# Patient Record
Sex: MALE | Race: WHITE | NOT HISPANIC OR LATINO | Employment: FULL TIME | URBAN - METROPOLITAN AREA
[De-identification: names, ages, dates, MRNs, and addresses within clinical notes are randomized per-mention and may not be internally consistent; named-entity substitution may affect disease eponyms.]

---

## 2018-07-30 ENCOUNTER — CLINICAL SUPPORT (OUTPATIENT)
Dept: INTERNAL MEDICINE CLINIC | Facility: CLINIC | Age: 73
End: 2018-07-30

## 2018-07-30 DIAGNOSIS — Z11.1 PPD SCREENING TEST: Primary | ICD-10-CM

## 2018-08-01 ENCOUNTER — CLINICAL SUPPORT (OUTPATIENT)
Dept: INTERNAL MEDICINE CLINIC | Facility: CLINIC | Age: 73
End: 2018-08-01

## 2018-08-01 DIAGNOSIS — Z11.1 ENCOUNTER FOR PPD SKIN TEST READING: Primary | ICD-10-CM

## 2018-08-01 LAB
INDURATION: 0 MM
TB SKIN TEST: NEGATIVE

## 2018-08-01 NOTE — PROGRESS NOTES
Pt in office for PPD reading, pt is 1 hour early for test per DR Laura Sees ok to read results   Test was 0 mm   Negative results

## 2021-12-21 ENCOUNTER — APPOINTMENT (EMERGENCY)
Dept: RADIOLOGY | Facility: HOSPITAL | Age: 76
DRG: 316 | End: 2021-12-21
Payer: COMMERCIAL

## 2021-12-21 ENCOUNTER — HOSPITAL ENCOUNTER (INPATIENT)
Facility: HOSPITAL | Age: 76
LOS: 1 days | Discharge: HOME | DRG: 316 | End: 2021-12-21
Attending: EMERGENCY MEDICINE | Admitting: INTERNAL MEDICINE
Payer: COMMERCIAL

## 2021-12-21 VITALS
TEMPERATURE: 97.4 F | DIASTOLIC BLOOD PRESSURE: 70 MMHG | SYSTOLIC BLOOD PRESSURE: 113 MMHG | OXYGEN SATURATION: 98 % | HEART RATE: 73 BPM | RESPIRATION RATE: 16 BRPM

## 2021-12-21 DIAGNOSIS — R55 SYNCOPE: Primary | ICD-10-CM

## 2021-12-21 DIAGNOSIS — R00.1 BRADYCARDIA: ICD-10-CM

## 2021-12-21 DIAGNOSIS — R79.89 ELEVATED SERUM CREATININE: ICD-10-CM

## 2021-12-21 PROBLEM — I10 HTN (HYPERTENSION): Status: ACTIVE | Noted: 2021-12-21

## 2021-12-21 PROBLEM — E78.5 HLD (HYPERLIPIDEMIA): Status: ACTIVE | Noted: 2021-12-21

## 2021-12-21 PROBLEM — N28.9 ABNORMAL RENAL FUNCTION: Status: ACTIVE | Noted: 2021-12-21

## 2021-12-21 PROBLEM — K20.90 ESOPHAGITIS: Status: ACTIVE | Noted: 2021-12-21

## 2021-12-21 PROBLEM — D64.9 ANEMIA: Status: ACTIVE | Noted: 2021-12-21

## 2021-12-21 LAB
ALBUMIN SERPL BCP-MCNC: 3.5 G/DL (ref 3.5–5)
ALP SERPL-CCNC: 109 U/L (ref 46–116)
ALT SERPL W P-5'-P-CCNC: 18 U/L (ref 12–78)
ANION GAP SERPL CALCULATED.3IONS-SCNC: 4 MMOL/L (ref 4–13)
AST SERPL W P-5'-P-CCNC: 15 U/L (ref 5–45)
ATRIAL RATE: 56 BPM
BASOPHILS # BLD AUTO: 0.02 THOUSANDS/ΜL (ref 0–0.1)
BASOPHILS NFR BLD AUTO: 0 % (ref 0–1)
BILIRUB SERPL-MCNC: 0.35 MG/DL (ref 0.2–1)
BUN SERPL-MCNC: 30 MG/DL (ref 5–25)
CALCIUM SERPL-MCNC: 8.6 MG/DL (ref 8.3–10.1)
CARDIAC TROPONIN I PNL SERPL HS: 2 NG/L
CHLORIDE SERPL-SCNC: 109 MMOL/L (ref 100–108)
CO2 SERPL-SCNC: 26 MMOL/L (ref 21–32)
CREAT SERPL-MCNC: 1.68 MG/DL (ref 0.6–1.3)
EOSINOPHIL # BLD AUTO: 0.17 THOUSAND/ΜL (ref 0–0.61)
EOSINOPHIL NFR BLD AUTO: 2 % (ref 0–6)
ERYTHROCYTE [DISTWIDTH] IN BLOOD BY AUTOMATED COUNT: 13.8 % (ref 11.6–15.1)
GFR SERPL CREATININE-BSD FRML MDRD: 39 ML/MIN/1.73SQ M
GLUCOSE SERPL-MCNC: 111 MG/DL (ref 65–140)
HCT VFR BLD AUTO: 33.5 % (ref 36.5–49.3)
HGB BLD-MCNC: 10.8 G/DL (ref 12–17)
IMM GRANULOCYTES # BLD AUTO: 0.04 THOUSAND/UL (ref 0–0.2)
IMM GRANULOCYTES NFR BLD AUTO: 1 % (ref 0–2)
LYMPHOCYTES # BLD AUTO: 1.03 THOUSANDS/ΜL (ref 0.6–4.47)
LYMPHOCYTES NFR BLD AUTO: 12 % (ref 14–44)
MCH RBC QN AUTO: 29.3 PG (ref 26.8–34.3)
MCHC RBC AUTO-ENTMCNC: 32.2 G/DL (ref 31.4–37.4)
MCV RBC AUTO: 91 FL (ref 82–98)
MONOCYTES # BLD AUTO: 0.69 THOUSAND/ΜL (ref 0.17–1.22)
MONOCYTES NFR BLD AUTO: 8 % (ref 4–12)
NEUTROPHILS # BLD AUTO: 6.59 THOUSANDS/ΜL (ref 1.85–7.62)
NEUTS SEG NFR BLD AUTO: 77 % (ref 43–75)
NRBC BLD AUTO-RTO: 0 /100 WBCS
P AXIS: 69 DEGREES
PLATELET # BLD AUTO: 168 THOUSANDS/UL (ref 149–390)
PMV BLD AUTO: 10.6 FL (ref 8.9–12.7)
POTASSIUM SERPL-SCNC: 4 MMOL/L (ref 3.5–5.3)
PR INTERVAL: 162 MS
PROT SERPL-MCNC: 6.7 G/DL (ref 6.4–8.2)
QRS AXIS: 38 DEGREES
QRSD INTERVAL: 88 MS
QT INTERVAL: 422 MS
QTC INTERVAL: 407 MS
RBC # BLD AUTO: 3.68 MILLION/UL (ref 3.88–5.62)
SODIUM SERPL-SCNC: 139 MMOL/L (ref 136–145)
T WAVE AXIS: 52 DEGREES
VENTRICULAR RATE: 56 BPM
WBC # BLD AUTO: 8.54 THOUSAND/UL (ref 4.31–10.16)

## 2021-12-21 PROCEDURE — 80053 COMPREHEN METABOLIC PANEL: CPT | Performed by: EMERGENCY MEDICINE

## 2021-12-21 PROCEDURE — NC001 PR NO CHARGE: Performed by: INTERNAL MEDICINE

## 2021-12-21 PROCEDURE — 85025 COMPLETE CBC W/AUTO DIFF WBC: CPT | Performed by: EMERGENCY MEDICINE

## 2021-12-21 PROCEDURE — 93010 ELECTROCARDIOGRAM REPORT: CPT | Performed by: INTERNAL MEDICINE

## 2021-12-21 PROCEDURE — 99223 1ST HOSP IP/OBS HIGH 75: CPT | Performed by: INTERNAL MEDICINE

## 2021-12-21 PROCEDURE — 99285 EMERGENCY DEPT VISIT HI MDM: CPT

## 2021-12-21 PROCEDURE — 99285 EMERGENCY DEPT VISIT HI MDM: CPT | Performed by: EMERGENCY MEDICINE

## 2021-12-21 PROCEDURE — 84484 ASSAY OF TROPONIN QUANT: CPT | Performed by: EMERGENCY MEDICINE

## 2021-12-21 PROCEDURE — 71045 X-RAY EXAM CHEST 1 VIEW: CPT

## 2021-12-21 PROCEDURE — 99222 1ST HOSP IP/OBS MODERATE 55: CPT | Performed by: INTERNAL MEDICINE

## 2021-12-21 PROCEDURE — 93005 ELECTROCARDIOGRAM TRACING: CPT

## 2021-12-21 RX ORDER — SIMVASTATIN 40 MG
40 TABLET ORAL
COMMUNITY

## 2021-12-21 RX ORDER — ACETAMINOPHEN 325 MG/1
650 TABLET ORAL EVERY 6 HOURS PRN
Status: CANCELLED | OUTPATIENT
Start: 2021-12-21

## 2021-12-21 RX ORDER — PANTOPRAZOLE SODIUM 40 MG/1
40 TABLET, DELAYED RELEASE ORAL DAILY
COMMUNITY
Start: 2021-11-22

## 2021-12-21 RX ORDER — CALCIUM CARBONATE 200(500)MG
1000 TABLET,CHEWABLE ORAL DAILY PRN
Status: CANCELLED | OUTPATIENT
Start: 2021-12-21

## 2021-12-21 RX ORDER — PANTOPRAZOLE SODIUM 40 MG/1
40 TABLET, DELAYED RELEASE ORAL DAILY
Status: CANCELLED | OUTPATIENT
Start: 2021-12-21

## 2021-12-21 RX ORDER — PRAVASTATIN SODIUM 20 MG
20 TABLET ORAL
Status: CANCELLED | OUTPATIENT
Start: 2021-12-21

## 2021-12-21 RX ORDER — SODIUM CHLORIDE, SODIUM GLUCONATE, SODIUM ACETATE, POTASSIUM CHLORIDE, MAGNESIUM CHLORIDE, SODIUM PHOSPHATE, DIBASIC, AND POTASSIUM PHOSPHATE .53; .5; .37; .037; .03; .012; .00082 G/100ML; G/100ML; G/100ML; G/100ML; G/100ML; G/100ML; G/100ML
75 INJECTION, SOLUTION INTRAVENOUS CONTINUOUS
Status: CANCELLED | OUTPATIENT
Start: 2021-12-21

## 2021-12-21 RX ORDER — LISINOPRIL 5 MG/1
5 TABLET ORAL DAILY
COMMUNITY
Start: 2021-11-23 | End: 2021-12-21

## 2021-12-21 RX ORDER — HEPARIN SODIUM 5000 [USP'U]/ML
5000 INJECTION, SOLUTION INTRAVENOUS; SUBCUTANEOUS EVERY 8 HOURS SCHEDULED
Status: CANCELLED | OUTPATIENT
Start: 2021-12-21

## 2021-12-21 RX ORDER — ONDANSETRON 2 MG/ML
4 INJECTION INTRAMUSCULAR; INTRAVENOUS EVERY 6 HOURS PRN
Status: CANCELLED | OUTPATIENT
Start: 2021-12-21

## 2022-02-18 ENCOUNTER — APPOINTMENT (OUTPATIENT)
Dept: LAB | Facility: IMAGING CENTER | Age: 77
End: 2022-02-18
Payer: COMMERCIAL

## 2022-02-18 DIAGNOSIS — R42 DIZZINESS AND GIDDINESS: ICD-10-CM

## 2022-02-18 DIAGNOSIS — D64.9 ANEMIA, UNSPECIFIED TYPE: ICD-10-CM

## 2022-02-18 LAB
ANION GAP SERPL CALCULATED.3IONS-SCNC: 3 MMOL/L (ref 4–13)
BASOPHILS # BLD AUTO: 0.07 THOUSANDS/ΜL (ref 0–0.1)
BASOPHILS NFR BLD AUTO: 1 % (ref 0–1)
BUN SERPL-MCNC: 29 MG/DL (ref 5–25)
CALCIUM SERPL-MCNC: 10.5 MG/DL (ref 8.3–10.1)
CHLORIDE SERPL-SCNC: 106 MMOL/L (ref 100–108)
CO2 SERPL-SCNC: 30 MMOL/L (ref 21–32)
CREAT SERPL-MCNC: 1.47 MG/DL (ref 0.6–1.3)
EOSINOPHIL # BLD AUTO: 0.73 THOUSAND/ΜL (ref 0–0.61)
EOSINOPHIL NFR BLD AUTO: 8 % (ref 0–6)
ERYTHROCYTE [DISTWIDTH] IN BLOOD BY AUTOMATED COUNT: 13.2 % (ref 11.6–15.1)
FERRITIN SERPL-MCNC: 16 NG/ML (ref 8–388)
GFR SERPL CREATININE-BSD FRML MDRD: 46 ML/MIN/1.73SQ M
GLUCOSE SERPL-MCNC: 98 MG/DL (ref 65–140)
HCT VFR BLD AUTO: 43.6 % (ref 36.5–49.3)
HGB BLD-MCNC: 13.6 G/DL (ref 12–17)
IMM GRANULOCYTES # BLD AUTO: 0.03 THOUSAND/UL (ref 0–0.2)
IMM GRANULOCYTES NFR BLD AUTO: 0 % (ref 0–2)
LYMPHOCYTES # BLD AUTO: 2.15 THOUSANDS/ΜL (ref 0.6–4.47)
LYMPHOCYTES NFR BLD AUTO: 25 % (ref 14–44)
MCH RBC QN AUTO: 27 PG (ref 26.8–34.3)
MCHC RBC AUTO-ENTMCNC: 31.2 G/DL (ref 31.4–37.4)
MCV RBC AUTO: 87 FL (ref 82–98)
MONOCYTES # BLD AUTO: 1.02 THOUSAND/ΜL (ref 0.17–1.22)
MONOCYTES NFR BLD AUTO: 12 % (ref 4–12)
NEUTROPHILS # BLD AUTO: 4.75 THOUSANDS/ΜL (ref 1.85–7.62)
NEUTS SEG NFR BLD AUTO: 54 % (ref 43–75)
NRBC BLD AUTO-RTO: 0 /100 WBCS
PLATELET # BLD AUTO: 235 THOUSANDS/UL (ref 149–390)
PMV BLD AUTO: 11.8 FL (ref 8.9–12.7)
POTASSIUM SERPL-SCNC: 4.6 MMOL/L (ref 3.5–5.3)
RBC # BLD AUTO: 5.04 MILLION/UL (ref 3.88–5.62)
SODIUM SERPL-SCNC: 139 MMOL/L (ref 136–145)
WBC # BLD AUTO: 8.75 THOUSAND/UL (ref 4.31–10.16)

## 2022-02-18 PROCEDURE — 80048 BASIC METABOLIC PNL TOTAL CA: CPT

## 2022-02-18 PROCEDURE — 85025 COMPLETE CBC W/AUTO DIFF WBC: CPT

## 2022-02-18 PROCEDURE — 82728 ASSAY OF FERRITIN: CPT

## 2022-02-18 PROCEDURE — 36415 COLL VENOUS BLD VENIPUNCTURE: CPT

## 2023-12-19 ENCOUNTER — TELEPHONE (OUTPATIENT)
Dept: GASTROENTEROLOGY | Facility: CLINIC | Age: 78
End: 2023-12-19

## 2023-12-19 ENCOUNTER — TRANSCRIBE ORDERS (OUTPATIENT)
Dept: GASTROENTEROLOGY | Facility: CLINIC | Age: 78
End: 2023-12-19

## 2023-12-19 NOTE — TELEPHONE ENCOUNTER
Z12.10 (ICD-10-CM) - Encounter for screening for malignant neoplasm of intestinal tract, unspecified     [id number]    Effective: 12/12/23-6/9/24    I called the patient and LV for them to schedule and OV for a colonoscopy consultation.

## 2023-12-27 ENCOUNTER — EVALUATION (OUTPATIENT)
Age: 78
End: 2023-12-27
Payer: COMMERCIAL

## 2023-12-27 DIAGNOSIS — M25.562 LEFT KNEE PAIN, UNSPECIFIED CHRONICITY: ICD-10-CM

## 2023-12-27 DIAGNOSIS — M25.562 CHRONIC PAIN OF LEFT KNEE: Primary | ICD-10-CM

## 2023-12-27 DIAGNOSIS — G89.29 CHRONIC PAIN OF LEFT KNEE: Primary | ICD-10-CM

## 2023-12-27 PROCEDURE — 97161 PT EVAL LOW COMPLEX 20 MIN: CPT | Performed by: PHYSICAL THERAPIST

## 2023-12-27 PROCEDURE — 97112 NEUROMUSCULAR REEDUCATION: CPT | Performed by: PHYSICAL THERAPIST

## 2023-12-27 PROCEDURE — 97110 THERAPEUTIC EXERCISES: CPT | Performed by: PHYSICAL THERAPIST

## 2023-12-27 NOTE — LETTER
2023    Madhavi Smith MD  [address] [facility] [address]    Patient: Brady Alicea   YOB: 1945   Date of Visit: 2023     Encounter Diagnosis     ICD-10-CM    1. Chronic pain of left knee  M25.562     G89.29       2. Left knee pain, unspecified chronicity  M25.562           Dear Dr. Smith:    Thank you for your recent referral of Brady Alicea. Please review the attached evaluation summary from Brady Allen's recent visit.     Please verify that you agree with the plan of care by signing the attached order.     If you have any questions or concerns, please do not hesitate to call.     I sincerely appreciate the opportunity to share in the care of one of your patients and hope to have another opportunity to work with you in the near future.       Sincerely,    Kathi Seals, PT      Referring Provider:      I certify that I have read the below Plan of Care and certify the need for these services furnished under this plan of treatment while under my care.                    Madhavi Smith MD  [address] [facility] [address]  Via Fax: [phone]          PT Evaluation     Today's date: 2023  Patient name: Brady Alicea  : 1945  MRN: [id number]  Referring provider: Madhavi Smith MD  Dx:   Encounter Diagnosis     ICD-10-CM    1. Chronic pain of left knee  M25.562     G89.29       2. Left knee pain, unspecified chronicity  M25.562           Start Time: 1530  Stop Time: 1630  Total time in clinic (min): 60 minutes    Assessment  Assessment details: Patient is a 77 yo male presenting to physical therapy with symptoms consistent with L knee that started about 3 years ago. Patient presents with decreased L knee AROM, decreased hip and knee strength and gait deviations such as lateral knee and decreased heel strike on the L. Patient has increased difficulty with walking, descending  steps, bed mobility and sleeping. PT will address the noted impairments by performing hip and knee strengthening, stretching, balance, functional activities and manual techniques to allow the patient to return to his PLOF. PT recommended 2x/week for 6-8 weeks c a good prognosis 2* PLOF.    Impairments: abnormal gait, abnormal or restricted ROM, activity intolerance, impaired balance, impaired physical strength, lacks appropriate home exercise program, pain with function, poor posture  and poor body mechanics  Understanding of Dx/Px/POC: good   Prognosis: good    Goals  STG: In four weeks the patient will:    1. Be (I) with his HEP.  2. Increase hip and knee strength to 4+/5 MMT score to assist c ADLs.  3. Increase L knee extension AROM to 0-3 degrees to assist c steps and gait.      LTG: In eight weeks, the patient will:    1. Increase FOTO score to 70 to demonstrate improvements in symptoms and function.  2. Demonstrate 50% improvements in L knee AROM without pain.  3. Perform 10 minutes on the bike at home daily without pain.   4. Increase hip and knee strength to 5/5 MMT score to assist c prolonged activities.   5. Amb 1 mile with minimal pain noted.   6. Roll over in bed with no pain noted.   7. Descend a full flight of stairs with < 2/10 pain.     Plan  Patient would benefit from: skilled physical therapy and PT eval  Planned modality interventions: cryotherapy and thermotherapy: hydrocollator packs  Planned therapy interventions: IASTM, joint mobilization, kinesiology taping, manual therapy, massage, Carson taping, neuromuscular re-education, patient education, postural training, abdominal trunk stabilization, balance, body mechanics training, breathing training, strengthening, stretching, therapeutic activities, therapeutic exercise, transfer training, home exercise program, functional ROM exercises, flexibility and gait training  Frequency: 2x week  Duration in visits: 16  Duration in weeks: 8  Plan of  "Care beginning date: 2023  Plan of Care expiration date: 2024  Treatment plan discussed with: patient        Subjective Evaluation    History of Present Illness  Mechanism of injury: Patient noted L knee pain that started about 3 years ago. Patient noted that he was playing football and injured it. Patient noted that sleeping is painful and stiff. Patient noted pain with twisting or pivoting of the knee. Patient likes to walk 1-2 miles, however now he has pain with walking currently. Patient noted that he was walking with the SPC due to R foot surgery and he feels that he overcompensated. Patient does not walk with the cane currently. Descending the steps is challenging and performs a step to pattern. Patient noted that he recently fx a pin in his foot and had surgery to fix that. Patient noted that his balance is challenged at times. Patient noted that his pain is intermittent and the knee pops.   Patient Goals  Patient goals for therapy: increased strength, independence with ADLs/IADLs, return to sport/leisure activities, increased motion, improved balance and decreased pain  Patient goal: \"to be more confident with walking.\" \"to negotiate steps with less pain.\"  Pain  Current pain ratin  At best pain ratin  At worst pain ratin  Location: L patella  Quality: sharp (lasts a few seconds; stiffness; night is painful)  Relieving factors: rest (biofreeze)  Aggravating factors: standing, walking, sitting and lifting (sleeping)  Progression: worsening    Social Support  Steps to enter house: yes  4  Stairs in house: yes   8  Lives in: multiple-level home  Lives with: adult children (Son and son's girlfriend)    Employment status: not working  Hand dominance: left      Diagnostic Tests  X-ray: abnormal        Objective     Active Range of Motion   Left Knee   Flexion: 125 degrees   Extension: 8 degrees   Extensor la degrees     Right Knee   Normal active range of motion    Passive Range of " "Motion   Left Knee   Flexion: 125 degrees   Extension: 3 degrees     Right Knee   Normal passive range of motion    Mobility   Patellar Mobility:   Left Knee   WFL: medial, lateral, superior and inferior.   Tibiofemoral Mobility:   Left knee Hypomobile in the anterior and posterior tibiofemoral tendon(s).     Strength/Myotome Testing     Left Hip   Planes of Motion   Flexion: 4  Extension: 4-  Abduction: 4-  Adduction: 4    Right Hip   Planes of Motion   Flexion: 4  Extension: 4-  Abduction: 4-  Adduction: 4    Left Knee   Flexion: 4-  Extension: 4    Right Knee   Flexion: 4-  Extension: 4    Left Ankle/Foot   Dorsiflexion: 4    Right Ankle/Foot   Dorsiflexion: 4    Tests     Left Knee   Negative anterior drawer and posterior drawer.       Flowsheet Rows      Flowsheet Row Most Recent Value   PT/OT G-Codes    Current Score 60   Projected Score 70   Assessment Type Evaluation               Precautions: hx of R foot surgery, short term memory loss, hx of HTN      Manuals 12/27            L patellar joint mobs (sup/inf/med/inf) nv            L knee PROM nv                                      Neuro Re-Ed             HEP edu MW            Quad set nv            clams nv            3 way hip nv            FTEO/FETC nv            Tandem stance nv                         Ther Ex             Nustep nv            Hip add X10  5\" hold            Hip abd X10  5\" hold            LAQ nv            Heel raises nv            Glute set X10  5\" hold            Seated marching nv                         Ther Activity                                       Gait Training                                       Modalities                                                            "

## 2023-12-29 ENCOUNTER — OFFICE VISIT (OUTPATIENT)
Age: 78
End: 2023-12-29
Payer: COMMERCIAL

## 2023-12-29 DIAGNOSIS — G89.29 CHRONIC PAIN OF LEFT KNEE: Primary | ICD-10-CM

## 2023-12-29 DIAGNOSIS — M25.562 LEFT KNEE PAIN, UNSPECIFIED CHRONICITY: ICD-10-CM

## 2023-12-29 DIAGNOSIS — M25.562 CHRONIC PAIN OF LEFT KNEE: Primary | ICD-10-CM

## 2023-12-29 PROCEDURE — 97112 NEUROMUSCULAR REEDUCATION: CPT | Performed by: PHYSICAL THERAPIST

## 2023-12-29 PROCEDURE — 97110 THERAPEUTIC EXERCISES: CPT | Performed by: PHYSICAL THERAPIST

## 2023-12-29 NOTE — PROGRESS NOTES
"Daily Note     Today's date: 2023  Patient name: Brady Alicea  : 1945  MRN: [id number]  Referring provider: Madhavi Smith MD  Dx:   Encounter Diagnosis     ICD-10-CM    1. Chronic pain of left knee  M25.562     G89.29       2. Left knee pain, unspecified chronicity  M25.562           Start Time: 1115  Stop Time: 1155  Total time in clinic (min): 40 minutes    Subjective: Patient noted that he tried to perform his HEP on the floor and had a hard time getting off the floor and felt dizzy. Patient noted that his pain is intermittent in the knee.       Objective: See treatment diary below      Assessment: Patient performed Nustep aerobic exercise to increase blood flow to the area being treated, prepare the muscles for strength training and stretching, improve overall tolerance to activity, and aerobic endurance. PT educated the patient on performing his HEP on his bed or in a chair. PT printed out a HEP that can be performed in a chair to decreased vertigo symptoms. Patient performed exercises with min VCS for form and increased pain noted by the patient. Patient would benefit from continued PT to allow the patient to return to his PLOF.         Plan: Continue per plan of care.      Precautions: hx of R foot surgery, short term memory loss, hx of HTN, supine to sit causes dizzy symptoms.       Manuals            L patellar joint mobs (sup/inf/med/inf) nv            L knee PROM nv                                      Neuro Re-Ed             HEP edu MW MW           Quad set nv            clams nv            3 way hip nv            FTEO/FETC nv            Tandem stance nv                         Ther Ex             Nustep nv 10'  Lvl 2           Hip add X10  5\" hold X20 5\" hold  seated           Hip abd X10  5\" hold GTB  X20  5\" hold           LAQ nv X20 ea           Heel raises nv X20 ea           Seated hamstring curls  GTB  X20 ea           Glute set X10  5\" hold          "   Seated marching nv GTB  x20                        Ther Activity                                       Gait Training                                       Modalities

## 2024-01-02 ENCOUNTER — TRANSCRIBE ORDERS (OUTPATIENT)
Dept: GASTROENTEROLOGY | Facility: CLINIC | Age: 79
End: 2024-01-02

## 2024-01-04 ENCOUNTER — OFFICE VISIT (OUTPATIENT)
Age: 79
End: 2024-01-04
Payer: COMMERCIAL

## 2024-01-04 DIAGNOSIS — M25.562 LEFT KNEE PAIN, UNSPECIFIED CHRONICITY: ICD-10-CM

## 2024-01-04 DIAGNOSIS — G89.29 CHRONIC PAIN OF LEFT KNEE: Primary | ICD-10-CM

## 2024-01-04 DIAGNOSIS — M25.562 CHRONIC PAIN OF LEFT KNEE: Primary | ICD-10-CM

## 2024-01-04 PROCEDURE — 97112 NEUROMUSCULAR REEDUCATION: CPT | Performed by: PHYSICAL THERAPIST

## 2024-01-04 PROCEDURE — 97110 THERAPEUTIC EXERCISES: CPT | Performed by: PHYSICAL THERAPIST

## 2024-01-04 NOTE — PROGRESS NOTES
"Daily Note     Today's date: 2024  Patient name: Brady Alicea  : 1945  MRN: [id number]  Referring provider: Madhavi Smith MD  Dx:   Encounter Diagnosis     ICD-10-CM    1. Chronic pain of left knee  M25.562     G89.29       2. Left knee pain, unspecified chronicity  M25.562           Start Time: 0815  Stop Time: 0900  Total time in clinic (min): 45 minutes    Subjective: Patient noted that he is having trouble performing his HEP due to the chairs being too high. Patient noted that he tried to performed hip add on his bed, however he slid on the side and felt a crack in his neck.       Objective: See treatment diary below      Assessment: Patient performed Nustep aerobic exercise to increase blood flow to the area being treated, prepare the muscles for strength training and stretching, improve overall tolerance to activity, and aerobic endurance. PT educated the patient moderately about how to perform his HEP in a safe way. Patient noted some dizzy symptoms after standing hip abd and ext, however after sitting this subsided. Patient would benefit from continued PT to allow the patient to return to his PLOF.         Plan: Continue per plan of care.      Precautions: hx of R foot surgery, short term memory loss, hx of HTN, supine to sit causes dizzy symptoms.       Manuals /4          L patellar joint mobs (sup/inf/med/inf) nv            L knee PROM nv                                      Neuro Re-Ed             HEP edu MW MW MW          Quad set nv            clams nv            3 way hip nv  Abd + ext  X20 ea          FTEO/FETC nv            Tandem stance nv                         Ther Ex             Nustep nv 10'  Lvl 2 10'  Lvl 4          Hip add X10  5\" hold X20 5\" hold  seated X20 5\" hold  seated          Hip abd X10  5\" hold GTB  X20  5\" hold GTB  X20  5\" hold          LAQ nv X20 ea X20 ea          Heel raises nv X20 ea           Seated hamstring curls  GTB  X20 ea      " "     Glute set X10  5\" hold            Seated marching nv GTB  x20 X20 ea                       Ther Activity                                       Gait Training                                       Modalities                                              "

## 2024-01-05 ENCOUNTER — OFFICE VISIT (OUTPATIENT)
Age: 79
End: 2024-01-05
Payer: COMMERCIAL

## 2024-01-05 DIAGNOSIS — G89.29 CHRONIC PAIN OF LEFT KNEE: Primary | ICD-10-CM

## 2024-01-05 DIAGNOSIS — M25.562 CHRONIC PAIN OF LEFT KNEE: Primary | ICD-10-CM

## 2024-01-05 DIAGNOSIS — M25.562 LEFT KNEE PAIN, UNSPECIFIED CHRONICITY: ICD-10-CM

## 2024-01-05 PROCEDURE — 97112 NEUROMUSCULAR REEDUCATION: CPT | Performed by: PHYSICAL THERAPIST

## 2024-01-05 PROCEDURE — 97110 THERAPEUTIC EXERCISES: CPT | Performed by: PHYSICAL THERAPIST

## 2024-01-05 NOTE — PROGRESS NOTES
"Daily Note     Today's date: 2024  Patient name: Brady Alicea  : 1945  MRN: [id number]  Referring provider: Madhavi Smith MD  Dx:   Encounter Diagnosis     ICD-10-CM    1. Chronic pain of left knee  M25.562     G89.29       2. Left knee pain, unspecified chronicity  M25.562           Start Time: 1100  Stop Time: 1145  Total time in clinic (min): 45 minutes    Subjective: Patient noted that he is feeling okay today. Patient is nervous about his upcoming MRI. Patient noted that he has some L medial knee pain. Patient noted that he placed a stool underneath his feet to perform his HEP. Patient noted it was easier with a stool.       Objective: See treatment diary below      Assessment: Patient performed Nustep aerobic exercise to increase blood flow to the area being treated, prepare the muscles for strength training and stretching, improve overall tolerance to activity, and aerobic endurance. Patient performed exercises on a table with the back inclined. Patient performed quad sets with increased difficulty on the L when compared to the R. PT educated the patient on his HEP. Patient would benefit from continued PT to allow the patient to return to his PLOF.         Plan: Continue per plan of care.      Precautions: hx of R foot surgery, short term memory loss, hx of HTN, supine to sit causes dizzy symptoms.       Manuals          L patellar joint mobs (sup/inf/med/inf) nv            L knee PROM nv                                      Neuro Re-Ed             HEP edu MW MW MW MW         Quad set nv   X20  5\" hold ea         clams nv            3 way hip nv  Abd + ext  X20 ea          FTEO/FETC nv            Tandem stance nv                         Ther Ex             Nustep nv 10'  Lvl 2 10'  Lvl 4 10'  Lvl 4         Hip add X10  5\" hold X20 5\" hold  seated X20 5\" hold  seated X20 5\" hold  seated         Hip abd X10  5\" hold GTB  X20  5\" hold GTB  X20  5\" hold GTB  X20  5\" " "hold         LAQ nv X20 ea X20 ea X20 ea         Heel raises nv X20 ea           Seated hamstring curls  GTB  X20 ea           Glute set X10  5\" hold            Seated marching nv GTB  x20 X20 ea X20 ea                      Ther Activity                                       Gait Training                                       Modalities                                                "

## 2024-01-08 ENCOUNTER — OFFICE VISIT (OUTPATIENT)
Age: 79
End: 2024-01-08
Payer: COMMERCIAL

## 2024-01-08 DIAGNOSIS — M25.562 CHRONIC PAIN OF LEFT KNEE: Primary | ICD-10-CM

## 2024-01-08 DIAGNOSIS — G89.29 CHRONIC PAIN OF LEFT KNEE: Primary | ICD-10-CM

## 2024-01-08 DIAGNOSIS — M25.562 LEFT KNEE PAIN, UNSPECIFIED CHRONICITY: ICD-10-CM

## 2024-01-08 PROCEDURE — 97112 NEUROMUSCULAR REEDUCATION: CPT | Performed by: PHYSICAL THERAPIST

## 2024-01-08 PROCEDURE — 97110 THERAPEUTIC EXERCISES: CPT | Performed by: PHYSICAL THERAPIST

## 2024-01-08 NOTE — PROGRESS NOTES
"Daily Note     Today's date: 2024  Patient name: Brady Alicea  : 1945  MRN: [id number]  Referring provider: Madhavi Smith MD  Dx:   Encounter Diagnosis     ICD-10-CM    1. Chronic pain of left knee  M25.562     G89.29       2. Left knee pain, unspecified chronicity  M25.562           Start Time: 0830  Stop Time: 0900  Total time in clinic (min): 30 minutes    Subjective: Patient noted he has pain when he plants his left leg and twists.       Objective: See treatment diary below      Assessment: Patient performed Nustep aerobic exercise to increase blood flow to the area being treated, prepare the muscles for strength training and stretching, improve overall tolerance to activity, and aerobic endurance. Exercises were limited due to the patient being late to the appointment. PT introduced ankle strengthening exercise to assist c pivoting and pain. Patient required moderate cues for  form throughout the session. Patient noted some pain with step ups. PT educated the patient on his HEP. Patient would benefit from continued PT to allow the patient to return to his PLOF.         Plan: Continue per plan of care.      Precautions: hx of R foot surgery, short term memory loss, hx of HTN, supine to sit causes dizzy symptoms.       Manuals         L patellar joint mobs (sup/inf/med/inf) nv            L knee PROM nv                                      Neuro Re-Ed             HEP edu MW MW MW MW MW        Quad set nv   X20  5\" hold ea X20  5\" hold        clams nv            3 way hip nv  Abd + ext  X20 ea          FTEO/FETC nv            Tandem stance nv            Step ups     4\"  X15 ea        Ther Ex             Nustep nv 10'  Lvl 2 10'  Lvl 4 10'  Lvl 4 10'  Lvl 4        Hip add X10  5\" hold X20 5\" hold  seated X20 5\" hold  seated X20 5\" hold  seated X20  5\" hold         Hip abd X10  5\" hold GTB  X20  5\" hold GTB  X20  5\" hold GTB  X20  5\" hold         LAQ nv X20 ea X20 " "ea X20 ea         Heel raises nv X20 ea           Seated hamstring curls  GTB  X20 ea           Glute set X10  5\" hold            Seated marching nv GTB  x20 X20 ea X20 ea         Ankle eversion and inversion with PT holding band     OTB  X10 ea(B)        Ther Activity                                       Gait Training                                       Modalities                                                  "

## 2024-01-12 ENCOUNTER — OFFICE VISIT (OUTPATIENT)
Age: 79
End: 2024-01-12
Payer: COMMERCIAL

## 2024-01-12 DIAGNOSIS — G89.29 CHRONIC PAIN OF LEFT KNEE: Primary | ICD-10-CM

## 2024-01-12 DIAGNOSIS — M25.562 CHRONIC PAIN OF LEFT KNEE: Primary | ICD-10-CM

## 2024-01-12 DIAGNOSIS — M25.562 LEFT KNEE PAIN, UNSPECIFIED CHRONICITY: ICD-10-CM

## 2024-01-12 PROCEDURE — 97110 THERAPEUTIC EXERCISES: CPT | Performed by: PHYSICAL THERAPIST

## 2024-01-12 PROCEDURE — 97112 NEUROMUSCULAR REEDUCATION: CPT | Performed by: PHYSICAL THERAPIST

## 2024-01-12 NOTE — PROGRESS NOTES
"Daily Note     Today's date: 2024  Patient name: Brady Alicea  : 1945  MRN: [id number]  Referring provider: Madhavi Smith MD  Dx:   Encounter Diagnosis     ICD-10-CM    1. Chronic pain of left knee  M25.562     G89.29       2. Left knee pain, unspecified chronicity  M25.562           Start Time: 09  Stop Time: 1030  Total time in clinic (min): 45 minutes    Subjective: Patient noted that his knee gives way at times. Patient noted that he went for a walk yesterday with no difficulty.       Objective: See treatment diary below      Assessment: Patient performed Nustep aerobic exercise to increase blood flow to the area being treated, prepare the muscles for strength training and stretching, improve overall tolerance to activity, and aerobic endurance. Patient performed standing exercises with min Vcs without increased pain. Patient noted some pain with LAQ. Patient improved with ankle ROM during strengthening exercises. Patient continues to improve with strength 2* walking yesterday without any pain. Patient would benefit from continued PT to allow the patient to return to his PLOF.         Plan: Continue per plan of care.      Precautions: hx of R foot surgery, short term memory loss, hx of HTN, supine to sit causes dizzy symptoms.       Manuals        L patellar joint mobs (sup/inf/med/inf) nv            L knee PROM nv                                      Neuro Re-Ed             HEP edu MW MW MW MW MW MW       Quad set nv   X20  5\" hold ea X20  5\" hold X20  5\" hold       clams nv            3 way hip nv  Abd + ext  X20 ea   Abd + ext  X20 ea       FTEO/FETC nv            Tandem stance nv            Step ups     4\"  X15 ea Fwd & lateral  4\"  X15 ea       Ther Ex             Nustep nv 10'  Lvl 2 10'  Lvl 4 10'  Lvl 4 10'  Lvl 4 10' lvl 4       Hip add X10  5\" hold X20 5\" hold  seated X20 5\" hold  seated X20 5\" hold  seated X20  5\" hold  X20  5\" hold       Hip " "abd X10  5\" hold GTB  X20  5\" hold GTB  X20  5\" hold GTB  X20  5\" hold  GTB  X20  5\"hold       LAQ nv X20 ea X20 ea X20 ea  X20 ea       Heel raises nv X20 ea    hold       Seated hamstring curls  GTB  X20 ea           Glute set X10  5\" hold            Standing marching      X20 ea       Seated marching nv GTB  x20 X20 ea X20 ea  X20 ea       Ankle eversion and inversion with PT holding band     OTB  X10 ea(B) OTB  X15 ea (B)       Ther Activity                                       Gait Training                                       Modalities                                                    "

## 2024-01-17 ENCOUNTER — OFFICE VISIT (OUTPATIENT)
Age: 79
End: 2024-01-17
Payer: COMMERCIAL

## 2024-01-17 DIAGNOSIS — G89.29 CHRONIC PAIN OF LEFT KNEE: Primary | ICD-10-CM

## 2024-01-17 DIAGNOSIS — M25.562 LEFT KNEE PAIN, UNSPECIFIED CHRONICITY: ICD-10-CM

## 2024-01-17 DIAGNOSIS — M25.562 CHRONIC PAIN OF LEFT KNEE: Primary | ICD-10-CM

## 2024-01-17 PROCEDURE — 97110 THERAPEUTIC EXERCISES: CPT | Performed by: PHYSICAL THERAPIST

## 2024-01-17 PROCEDURE — 97112 NEUROMUSCULAR REEDUCATION: CPT | Performed by: PHYSICAL THERAPIST

## 2024-01-17 PROCEDURE — 97140 MANUAL THERAPY 1/> REGIONS: CPT | Performed by: PHYSICAL THERAPIST

## 2024-01-17 NOTE — PROGRESS NOTES
"Daily Note     Today's date: 2024  Patient name: Brady Alicea  : 1945  MRN: [id number]  Referring provider: Madhavi Smith MD  Dx:   Encounter Diagnosis     ICD-10-CM    1. Chronic pain of left knee  M25.562     G89.29       2. Left knee pain, unspecified chronicity  M25.562                      Subjective: Patient noted that he feels okay today.       Objective: See treatment diary below      Assessment: Patient performed Nustep aerobic exercise to increase blood flow to the area being treated, prepare the muscles for strength training and stretching, improve overall tolerance to activity, and aerobic endurance. PT performed STM to the L pes anserine and patellar mobs to assist c pain. Patient noted some pain during manual techniques, however no increased pain post session. Patient performed standing exercises with fatigue noted, however improved form. Patient continues to improve with ROM in the ankles. Patient would benefit from continued PT to allow the patient to return to his PLOF.         Plan: Continue per plan of care.      Precautions: hx of R foot surgery, short term memory loss, hx of HTN, supine to sit causes dizzy symptoms.       Manuals       L patellar joint mobs (sup/inf/med/inf) nv      MW grade III      L knee PROM nv                                      Neuro Re-Ed             HEP edu MW MW MW MW MW MW MW      Quad set nv   X20  5\" hold ea X20  5\" hold X20  5\" hold X20  5\" hold      clams nv            3 way hip nv  Abd + ext  X20 ea   Abd + ext  X20 ea Abd + ext  X20 ea      FTEO/FETC nv            Tandem stance nv            Step ups     4\"  X15 ea Fwd & lateral  4\"  X15 ea Fwd & lateral  4\"  X20 ea      Ther Ex             Nustep nv 10'  Lvl 2 10'  Lvl 4 10'  Lvl 4 10'  Lvl 4 10' lvl 4 10' lvl 4      Hip add X10  5\" hold X20 5\" hold  seated X20 5\" hold  seated X20 5\" hold  seated X20  5\" hold  X20  5\" hold X20  5\" hold      Hip abd " "X10  5\" hold GTB  X20  5\" hold GTB  X20  5\" hold GTB  X20  5\" hold  GTB  X20  5\"hold GTB  X20  5\" hold      LAQ nv X20 ea X20 ea X20 ea  X20 ea X10 ea      Heel raises nv X20 ea    hold       Seated hamstring curls  GTB  X20 ea           Glute set X10  5\" hold            Standing marching      X20 ea X20 ea      Seated marching nv GTB  x20 X20 ea X20 ea  X20 ea X20 ea      Ankle eversion and inversion with PT holding band     OTB  X10 ea(B) OTB  X15 ea (B) OTB  X20 ea (B)      Ther Activity                                       Gait Training                                       Modalities                                                      "

## 2024-01-18 ENCOUNTER — OFFICE VISIT (OUTPATIENT)
Age: 79
End: 2024-01-18
Payer: COMMERCIAL

## 2024-01-18 DIAGNOSIS — M25.562 CHRONIC PAIN OF LEFT KNEE: Primary | ICD-10-CM

## 2024-01-18 DIAGNOSIS — G89.29 CHRONIC PAIN OF LEFT KNEE: Primary | ICD-10-CM

## 2024-01-18 DIAGNOSIS — M25.562 LEFT KNEE PAIN, UNSPECIFIED CHRONICITY: ICD-10-CM

## 2024-01-18 PROCEDURE — 97110 THERAPEUTIC EXERCISES: CPT | Performed by: PHYSICAL THERAPIST

## 2024-01-18 PROCEDURE — 97112 NEUROMUSCULAR REEDUCATION: CPT | Performed by: PHYSICAL THERAPIST

## 2024-01-18 NOTE — PROGRESS NOTES
"Daily Note     Today's date: 2024  Patient name: Brady Alicea  : 1945  MRN: [id number]  Referring provider: Madhavi Smith MD  Dx:   Encounter Diagnosis     ICD-10-CM    1. Chronic pain of left knee  M25.562     G89.29       2. Left knee pain, unspecified chronicity  M25.562           Start Time: 0845  Stop Time: 920  Total time in clinic (min): 35 minutes    Subjective: Patient noted that he felt no increased pain after last session.       Objective: See treatment diary below      Assessment: Patient performed Nustep aerobic exercise to increase blood flow to the area being treated, prepare the muscles for strength training and stretching, improve overall tolerance to activity, and aerobic endurance. Patient performed standing exercises with some dizzy symptoms noted. Patient sat down and drank water and noted less dizzy symptoms. Patient performed table exercises with min VCS for form. PT performed patellar mobs with improved mobility. PT educated the patient on his HEP. Patient would benefit from continued PT to allow the patient to return to his PLOF.         Plan: Continue per plan of care.      Precautions: hx of R foot surgery, short term memory loss, hx of HTN, supine to sit causes dizzy symptoms.       Manuals      L patellar joint mobs (sup/inf/med/inf) nv      MW grade III MW  Grade III     L knee PROM nv            L pes anserine STM        MW                  Neuro Re-Ed             HEP edu MW MW MW MW MW MW MW MW     Quad set nv   X20  5\" hold ea X20  5\" hold X20  5\" hold X20  5\" hold X20  5\" hold     clams nv            3 way hip nv  Abd + ext  X20 ea   Abd + ext  X20 ea Abd + ext  X20 ea Abd + ext  X20 ea     FTEO/FETC nv            Tandem stance nv            Step ups     4\"  X15 ea Fwd & lateral  4\"  X15 ea Fwd & lateral  4\"  X20 ea Held     Ther Ex             Nustep nv 10'  Lvl 2 10'  Lvl 4 10'  Lvl 4 10'  Lvl 4 10' lvl 4 10' lvl " "4 10' lvl 4     Hip add X10  5\" hold X20 5\" hold  seated X20 5\" hold  seated X20 5\" hold  seated X20  5\" hold  X20  5\" hold X20  5\" hold X20  5\" hold     Hip abd X10  5\" hold GTB  X20  5\" hold GTB  X20  5\" hold GTB  X20  5\" hold  GTB  X20  5\"hold GTB  X20  5\" hold GTB  X20  5\" hold     LAQ nv X20 ea X20 ea X20 ea  X20 ea X10 ea x15ea     Heel raises nv X20 ea    hold       Seated hamstring curls  GTB  X20 ea           Glute set X10  5\" hold       X20  5\" hold     Standing marching      X20 ea X20 ea X20 ea     Seated marching nv GTB  x20 X20 ea X20 ea  X20 ea X20 ea X20 ea     Ankle eversion and inversion with PT holding band     OTB  X10 ea(B) OTB  X15 ea (B) OTB  X20 ea (B) OTB  X20 ea (B)     Ther Activity                                       Gait Training                                       Modalities                                                        "

## 2024-01-19 ENCOUNTER — OFFICE VISIT (OUTPATIENT)
Age: 79
End: 2024-01-19
Payer: COMMERCIAL

## 2024-01-19 ENCOUNTER — APPOINTMENT (OUTPATIENT)
Age: 79
End: 2024-01-19
Payer: COMMERCIAL

## 2024-01-19 DIAGNOSIS — M25.562 LEFT KNEE PAIN, UNSPECIFIED CHRONICITY: ICD-10-CM

## 2024-01-19 DIAGNOSIS — M25.562 CHRONIC PAIN OF LEFT KNEE: Primary | ICD-10-CM

## 2024-01-19 DIAGNOSIS — G89.29 CHRONIC PAIN OF LEFT KNEE: Primary | ICD-10-CM

## 2024-01-19 PROCEDURE — 97110 THERAPEUTIC EXERCISES: CPT | Performed by: PHYSICAL THERAPIST

## 2024-01-19 PROCEDURE — 97112 NEUROMUSCULAR REEDUCATION: CPT | Performed by: PHYSICAL THERAPIST

## 2024-01-19 NOTE — PROGRESS NOTES
"Daily Note     Today's date: 2024  Patient name: Brady Alicea  : 1945  MRN: [id number]  Referring provider: Madhavi Smith MD  Dx:   Encounter Diagnosis     ICD-10-CM    1. Chronic pain of left knee  M25.562     G89.29       2. Left knee pain, unspecified chronicity  M25.562           Start Time: 0815  Stop Time: 0900  Total time in clinic (min): 45 minutes    Subjective: Patient noted he feels okay today.       Objective: See treatment diary below      Assessment: Patient performed Nustep aerobic exercise to increase blood flow to the area being treated, prepare the muscles for strength training and stretching, improve overall tolerance to activity, and aerobic endurance. Patient performed step ups with improved form and the patient noted no pain. Patient noted some pain in the L knee with LAQ, however with decreased ROM his pain levels decreased. PT educated the patient and his son's girlfriend on his HEP and to walk or bike on a daily basis to assist with endurance. Patient would benefit from continued PT to allow the patient to return to his PLOF.         Plan: Continue per plan of care.      Precautions: hx of R foot surgery, short term memory loss, hx of HTN, supine to sit causes dizzy symptoms.       Manuals     L patellar joint mobs (sup/inf/med/inf) nv      MW grade III MW  Grade III     L knee PROM nv            L pes anserine STM        MW                  Neuro Re-Ed             HEP edu MW MW MW MW MW MW MW MW MW    Quad set nv   X20  5\" hold ea X20  5\" hold X20  5\" hold X20  5\" hold X20  5\" hold     clams nv            3 way hip nv  Abd + ext  X20 ea   Abd + ext  X20 ea Abd + ext  X20 ea Abd + ext  X20 ea Abd  X20 ea    FTEO/FETC nv            Tandem stance nv            Step ups     4\"  X15 ea Fwd & lateral  4\"  X15 ea Fwd & lateral  4\"  X20 ea Held Fwd and lateral  X20 ea  4\"    Ther Ex             Nustep nv 10'  Lvl 2 10'  Lvl 4 " "10'  Lvl 4 10'  Lvl 4 10' lvl 4 10' lvl 4 10' lvl 4 10' lvl 4    Hip add X10  5\" hold X20 5\" hold  seated X20 5\" hold  seated X20 5\" hold  seated X20  5\" hold  X20  5\" hold X20  5\" hold X20  5\" hold X20  5\" hold    Hip abd X10  5\" hold GTB  X20  5\" hold GTB  X20  5\" hold GTB  X20  5\" hold  GTB  X20  5\"hold GTB  X20  5\" hold GTB  X20  5\" hold GTB  X20 5\" hold    LAQ nv X20 ea X20 ea X20 ea  X20 ea X10 ea x15ea X15 ea    Heel raises nv X20 ea    hold       Seated hamstring curls  GTB  X20 ea           Glute set X10  5\" hold       X20  5\" hold     Standing marching      X20 ea X20 ea X20 ea X20 ea    Seated marching nv GTB  x20 X20 ea X20 ea  X20 ea X20 ea X20 ea X20 ea    Ankle eversion and inversion with PT holding band     OTB  X10 ea(B) OTB  X15 ea (B) OTB  X20 ea (B) OTB  X20 ea (B) OTB  X20 ea (B)    Ther Activity                                       Gait Training                                       Modalities                                                          "

## 2024-01-22 ENCOUNTER — OFFICE VISIT (OUTPATIENT)
Age: 79
End: 2024-01-22
Payer: COMMERCIAL

## 2024-01-22 DIAGNOSIS — M25.562 CHRONIC PAIN OF LEFT KNEE: Primary | ICD-10-CM

## 2024-01-22 DIAGNOSIS — G89.29 CHRONIC PAIN OF LEFT KNEE: Primary | ICD-10-CM

## 2024-01-22 DIAGNOSIS — M25.562 LEFT KNEE PAIN, UNSPECIFIED CHRONICITY: ICD-10-CM

## 2024-01-22 PROCEDURE — 97112 NEUROMUSCULAR REEDUCATION: CPT | Performed by: PHYSICAL THERAPIST

## 2024-01-22 PROCEDURE — 97110 THERAPEUTIC EXERCISES: CPT | Performed by: PHYSICAL THERAPIST

## 2024-01-22 NOTE — PROGRESS NOTES
"Daily Note     Today's date: 2024  Patient name: Brady Alicea  : 1945  MRN: [id number]  Referring provider: Madhavi Smith MD  Dx:   Encounter Diagnosis     ICD-10-CM    1. Chronic pain of left knee  M25.562     G89.29       2. Left knee pain, unspecified chronicity  M25.562           Start Time: 08  Stop Time: 08  Total time in clinic (min): 38 minutes    Subjective: Patient noted that he feels okay today.       Objective: See treatment diary below      Assessment: Patient performed Nustep aerobic exercise to increase blood flow to the area being treated, prepare the muscles for strength training and stretching, improve overall tolerance to activity, and aerobic endurance. PT introduced SLR, supine hip abd and calf stretch to assist c pain and strength. Patient continues to improve with strength 2* improved ROM, however patient continues to note pain. PT educated the patient on a potential knee brace to assist c pain with walking. Patient would benefit from continued PT to allow the patient to return to his PLOF.         Plan: Continue per plan of care.      Precautions: hx of R foot surgery, short term memory loss, hx of HTN, supine to sit causes dizzy symptoms.       Manuals    L patellar joint mobs (sup/inf/med/inf) nv      MW grade III MW  Grade III     L knee PROM nv            L pes anserine STM        MW                  Neuro Re-Ed             HEP edu MW MW MW MW MW MW MW MW MW MW   Quad set nv   X20  5\" hold ea X20  5\" hold X20  5\" hold X20  5\" hold X20  5\" hold  X20 5\" hold   clams nv            3 way hip nv  Abd + ext  X20 ea   Abd + ext  X20 ea Abd + ext  X20 ea Abd + ext  X20 ea Abd  X20 ea    FTEO/FETC nv            Tandem stance nv            Step ups     4\"  X15 ea Fwd & lateral  4\"  X15 ea Fwd & lateral  4\"  X20 ea Held Fwd and lateral  X20 ea  4\" Fwd and lateral  X20 ea  4\"   Ther Ex             Nustep nv 10'  Lvl 2 " "10'  Lvl 4 10'  Lvl 4 10'  Lvl 4 10' lvl 4 10' lvl 4 10' lvl 4 10' lvl 4 10' lvl 4   Hip add X10  5\" hold X20 5\" hold  seated X20 5\" hold  seated X20 5\" hold  seated X20  5\" hold  X20  5\" hold X20  5\" hold X20  5\" hold X20  5\" hold X20  5\" hold   Hip abd X10  5\" hold GTB  X20  5\" hold GTB  X20  5\" hold GTB  X20  5\" hold  GTB  X20  5\"hold GTB  X20  5\" hold GTB  X20  5\" hold GTB  X20 5\" hold GTB  X20 both sides  5\" hold    LAQ nv X20 ea X20 ea X20 ea  X20 ea X10 ea x15ea X15 ea X15 ea   Heel raises nv X20 ea    hold       Seated hamstring curls  GTB  X20 ea           Glute set X10  5\" hold       X20  5\" hold     Calf stretch in supported sitting          3x30\" ea   SLR          X10 ea   Supine hip abd          X10 ea   Standing marching      X20 ea X20 ea X20 ea X20 ea    Seated marching nv GTB  x20 X20 ea X20 ea  X20 ea X20 ea X20 ea X20 ea X20 ea   Ankle eversion and inversion with PT holding band     OTB  X10 ea(B) OTB  X15 ea (B) OTB  X20 ea (B) OTB  X20 ea (B) OTB  X20 ea (B) OTB  X20 ea (B)   Ther Activity                                       Gait Training                                       Modalities                                                            "

## 2024-01-26 ENCOUNTER — EVALUATION (OUTPATIENT)
Age: 79
End: 2024-01-26
Payer: COMMERCIAL

## 2024-01-26 DIAGNOSIS — M25.562 LEFT KNEE PAIN, UNSPECIFIED CHRONICITY: ICD-10-CM

## 2024-01-26 DIAGNOSIS — M25.562 CHRONIC PAIN OF LEFT KNEE: Primary | ICD-10-CM

## 2024-01-26 DIAGNOSIS — G89.29 CHRONIC PAIN OF LEFT KNEE: Primary | ICD-10-CM

## 2024-01-26 PROCEDURE — 97110 THERAPEUTIC EXERCISES: CPT | Performed by: PHYSICAL THERAPIST

## 2024-01-26 PROCEDURE — 97164 PT RE-EVAL EST PLAN CARE: CPT | Performed by: PHYSICAL THERAPIST

## 2024-01-26 PROCEDURE — 97112 NEUROMUSCULAR REEDUCATION: CPT | Performed by: PHYSICAL THERAPIST

## 2024-01-26 NOTE — LETTER
2024    Madhavi Smith MD  [address] [facility] [address]    Patient: Brady Alicea   YOB: 1945   Date of Visit: 2024     Encounter Diagnosis     ICD-10-CM    1. Chronic pain of left knee  M25.562     G89.29       2. Left knee pain, unspecified chronicity  M25.562           Dear Dr. Smith:    Thank you for your recent referral of Brady Alicea. Please review the attached evaluation summary from Brady Allen's recent visit.     Please verify that you agree with the plan of care by signing the attached order.     If you have any questions or concerns, please do not hesitate to call.     I sincerely appreciate the opportunity to share in the care of one of your patients and hope to have another opportunity to work with you in the near future.       Sincerely,    Kathi Seals, PT      Referring Provider:      I certify that I have read the below Plan of Care and certify the need for these services furnished under this plan of treatment while under my care.                    Madhavi Smith MD  [address] [facility] [place] [address]  Via Fax: [phone]          PT Re-Evaluation     Today's date: 2024  Patient name: Brady Alicea  : 1945  MRN: [id number]  Referring provider: Madhavi Smith MD  Dx:   Encounter Diagnosis     ICD-10-CM    1. Chronic pain of left knee  M25.562     G89.29       2. Left knee pain, unspecified chronicity  M25.562           Start Time: 0805  Stop Time: 0900  Total time in clinic (min): 55 minutes    Assessment  Assessment details: Patient is a 77 yo male presenting to physical therapy with symptoms consistent with L knee that started about 3 years ago. Since starting physical therapy the patient has improved with strength and endurance. Patient notes he feels stronger and is able to perform more ADLs. Pivoting motions and steps continue be challenging at  times. Although improvements have been made the patient continues to be limited in strength and endurance which affects his ADLs and walking. PT will continue to address the noted impairments by performing hip and knee strengthening, stretching, balance, functional activities and manual techniques to allow the patient to return to his PLOF. PT recommended 2x/week for 2 weeks c a good prognosis 2* noted improvements.    Impairments: abnormal gait, abnormal or restricted ROM, activity intolerance, impaired balance, impaired physical strength, lacks appropriate home exercise program, pain with function, poor posture  and poor body mechanics  Understanding of Dx/Px/POC: good   Prognosis: good    Goals  STG: In four weeks the patient will:    1. Be (I) with his HEP. (In progress)  2. Increase hip and knee strength to 4+/5 MMT score to assist c ADLs.(In progress)  3. Increase L knee extension AROM to 0-3 degrees to assist c steps and gait.(In progress)      LTG: In eight weeks, the patient will:    1. Increase FOTO score to 70 to demonstrate improvements in symptoms and function.(In progress)  2. Demonstrate 50% improvements in L knee AROM without pain.(In progress)  3. Perform 10 minutes on the bike at home daily without pain. (In progress)  4. Increase hip and knee strength to 5/5 MMT score to assist c prolonged activities. (In progress)  5. Amb 1 mile with minimal pain noted. (Has not tried)  6. Roll over in bed with no pain noted. (In progress)  7. Descend a full flight of stairs with < 2/10 pain. (In progress)    Plan  Patient would benefit from: skilled physical therapy and PT eval  Planned modality interventions: cryotherapy and thermotherapy: hydrocollator packs  Planned therapy interventions: IASTM, joint mobilization, kinesiology taping, manual therapy, massage, Carson taping, neuromuscular re-education, patient education, postural training, abdominal trunk stabilization, balance, body mechanics training,  "breathing training, strengthening, stretching, therapeutic activities, therapeutic exercise, transfer training, home exercise program, functional ROM exercises, flexibility and gait training  Frequency: 2x week  Duration in visits: 4  Duration in weeks: 2  Plan of Care beginning date: 2024  Plan of Care expiration date: 2024  Treatment plan discussed with: patient        Subjective Evaluation    History of Present Illness  Mechanism of injury: Patient noted that he since starting physical therapy he feels a lot stronger. Patient noted that his pain is less and is intermittent. Patient noted that he mainly has pain with a plant and twist motion on the L side. Patient noted he has not returned to walking. Patient is amb without an AD. Patient noted that he is performing a step to pattern when descending the steps, and performing a reciprocal pattern when ascending at times.   Patient Goals  Patient goals for therapy: increased strength, independence with ADLs/IADLs, return to sport/leisure activities, increased motion, improved balance and decreased pain  Patient goal: \"to be more confident with walking.\" ( in progress) \"to negotiate steps with less pain.\" (in progress)  Pain  Current pain ratin  At best pain ratin  At worst pain ratin  Location: L patella  Quality: sharp (lasts a few seconds; stiffness; night is painful)  Relieving factors: rest (biofreeze)  Aggravating factors: standing, walking, sitting and lifting (sleeping)  Progression: improved    Social Support  Steps to enter house: yes  4  Stairs in house: yes   8  Lives in: multiple-level home  Lives with: adult children (Son and son's girlfriend)    Employment status: not working  Hand dominance: left      Diagnostic Tests  X-ray: abnormal        Objective     Active Range of Motion   Left Knee   Flexion: 125 degrees   Extension: 8 degrees   Extensor la degrees     Right Knee   Normal active range of motion    Passive Range of " "Motion   Left Knee   Flexion: 125 degrees   Extension: 3 degrees     Right Knee   Normal passive range of motion    Mobility   Patellar Mobility:   Left Knee   WFL: medial, lateral, superior and inferior.   Tibiofemoral Mobility:   Left knee Hypomobile in the anterior and posterior tibiofemoral tendon(s).     Strength/Myotome Testing     Left Hip   Planes of Motion   Flexion: 4  Extension: 4-  Abduction: 4  Adduction: 4+    Right Hip   Planes of Motion   Flexion: 4  Extension: 4-  Abduction: 4  Adduction: 4+    Left Knee   Flexion: 4+  Extension: 4    Right Knee   Flexion: 4+  Extension: 4    Left Ankle/Foot   Dorsiflexion: 4    Right Ankle/Foot   Dorsiflexion: 4    Tests     Left Knee   Negative anterior drawer and posterior drawer.                Precautions: hx of R foot surgery, short term memory loss, hx of HTN      Manuals 1/26 12/29 1/4 1/5 1/8 1/12 1/17 1/18 1/19 1/22   L patellar joint mobs (sup/inf/med/inf)       MW grade III MW  Grade III     L knee PROM             L pes anserine STM        MW     RE MW            Neuro Re-Ed             HEP edu MW MW MW MW MW MW MW MW MW MW   Quad set X20  5\" hold   X20  5\" hold ea X20  5\" hold X20  5\" hold X20  5\" hold X20  5\" hold  X20 5\" hold   clams             3 way hip Abd + ext  X20 ea  Abd + ext  X20 ea   Abd + ext  X20 ea Abd + ext  X20 ea Abd + ext  X20 ea Abd  X20 ea    FTEO/FETC             Tandem stance             Step ups Fwd and lateral  X20 ea  4\"    4\"  X15 ea Fwd & lateral  4\"  X15 ea Fwd & lateral  4\"  X20 ea Held Fwd and lateral  X20 ea  4\" Fwd and lateral  X20 ea  4\"   Ther Ex             Nustep 10' lvl 4 10'  Lvl 2 10'  Lvl 4 10'  Lvl 4 10'  Lvl 4 10' lvl 4 10' lvl 4 10' lvl 4 10' lvl 4 10' lvl 4   Hip add X20  5\" hold X20 5\" hold  seated X20 5\" hold  seated X20 5\" hold  seated X20  5\" hold  X20  5\" hold X20  5\" hold X20  5\" hold X20  5\" hold X20  5\" hold   Hip abd GTB  X20 both sides  5\" hold GTB  X20  5\" hold GTB  X20  5\" hold GTB  X20  5\" hold  " "GTB  X20  5\"hold GTB  X20  5\" hold GTB  X20  5\" hold GTB  X20 5\" hold GTB  X20 both sides  5\" hold    LAQ X15 ea X20 ea X20 ea X20 ea  X20 ea X10 ea x15ea X15 ea X15 ea   Heel raises hold X20 ea    hold       Seated hamstring curls  GTB  X20 ea           Glute set        X20  5\" hold     Calf stretch in supported sitting          3x30\" ea   SLR          X10 ea   Supine hip abd          X10 ea   Standing marching X20 ea     X20 ea X20 ea X20 ea X20 ea    Seated marching X20 ea GTB  x20 X20 ea X20 ea  X20 ea X20 ea X20 ea X20 ea X20 ea   Ankle eversion and inversion with PT holding band OTB  X20 ea (B)    OTB  X10 ea(B) OTB  X15 ea (B) OTB  X20 ea (B) OTB  X20 ea (B) OTB  X20 ea (B) OTB  X20 ea (B)   Ther Activity                                       Gait Training                                       Modalities                                                         "

## 2024-01-26 NOTE — PROGRESS NOTES
PT Re-Evaluation     Today's date: 2024  Patient name: Brady Alicea  : 1945  MRN: [id number]  Referring provider: Madhavi Smith MD  Dx:   Encounter Diagnosis     ICD-10-CM    1. Chronic pain of left knee  M25.562     G89.29       2. Left knee pain, unspecified chronicity  M25.562           Start Time: 0805  Stop Time: 0900  Total time in clinic (min): 55 minutes    Assessment  Assessment details: Patient is a 79 yo male presenting to physical therapy with symptoms consistent with L knee that started about 3 years ago. Since starting physical therapy the patient has improved with strength and endurance. Patient notes he feels stronger and is able to perform more ADLs. Pivoting motions and steps continue be challenging at times. Although improvements have been made the patient continues to be limited in strength and endurance which affects his ADLs and walking. PT will continue to address the noted impairments by performing hip and knee strengthening, stretching, balance, functional activities and manual techniques to allow the patient to return to his PLOF. PT recommended 2x/week for 2 weeks c a good prognosis 2* noted improvements.    Impairments: abnormal gait, abnormal or restricted ROM, activity intolerance, impaired balance, impaired physical strength, lacks appropriate home exercise program, pain with function, poor posture  and poor body mechanics  Understanding of Dx/Px/POC: good   Prognosis: good    Goals  STG: In four weeks the patient will:    1. Be (I) with his HEP. (In progress)  2. Increase hip and knee strength to 4+/5 MMT score to assist c ADLs.(In progress)  3. Increase L knee extension AROM to 0-3 degrees to assist c steps and gait.(In progress)      LTG: In eight weeks, the patient will:    1. Increase FOTO score to 70 to demonstrate improvements in symptoms and function.(In progress)  2. Demonstrate 50% improvements in L knee AROM without pain.(In progress)  3.  "Perform 10 minutes on the bike at home daily without pain. (In progress)  4. Increase hip and knee strength to 5/5 MMT score to assist c prolonged activities. (In progress)  5. Amb 1 mile with minimal pain noted. (Has not tried)  6. Roll over in bed with no pain noted. (In progress)  7. Descend a full flight of stairs with < 2/10 pain. (In progress)    Plan  Patient would benefit from: skilled physical therapy and PT eval  Planned modality interventions: cryotherapy and thermotherapy: hydrocollator packs  Planned therapy interventions: IASTM, joint mobilization, kinesiology taping, manual therapy, massage, Carson taping, neuromuscular re-education, patient education, postural training, abdominal trunk stabilization, balance, body mechanics training, breathing training, strengthening, stretching, therapeutic activities, therapeutic exercise, transfer training, home exercise program, functional ROM exercises, flexibility and gait training  Frequency: 2x week  Duration in visits: 4  Duration in weeks: 2  Plan of Care beginning date: 1/26/2024  Plan of Care expiration date: 2/16/2024  Treatment plan discussed with: patient        Subjective Evaluation    History of Present Illness  Mechanism of injury: Patient noted that he since starting physical therapy he feels a lot stronger. Patient noted that his pain is less and is intermittent. Patient noted that he mainly has pain with a plant and twist motion on the L side. Patient noted he has not returned to walking. Patient is amb without an AD. Patient noted that he is performing a step to pattern when descending the steps, and performing a reciprocal pattern when ascending at times.   Patient Goals  Patient goals for therapy: increased strength, independence with ADLs/IADLs, return to sport/leisure activities, increased motion, improved balance and decreased pain  Patient goal: \"to be more confident with walking.\" ( in progress) \"to negotiate steps with less pain.\" (in " "progress)  Pain  Current pain ratin  At best pain ratin  At worst pain ratin  Location: L patella  Quality: sharp (lasts a few seconds; stiffness; night is painful)  Relieving factors: rest (biofreeze)  Aggravating factors: standing, walking, sitting and lifting (sleeping)  Progression: improved    Social Support  Steps to enter house: yes  4  Stairs in house: yes   8  Lives in: multiple-level home  Lives with: adult children (Son and son's girlfriend)    Employment status: not working  Hand dominance: left      Diagnostic Tests  X-ray: abnormal        Objective     Active Range of Motion   Left Knee   Flexion: 125 degrees   Extension: 8 degrees   Extensor la degrees     Right Knee   Normal active range of motion    Passive Range of Motion   Left Knee   Flexion: 125 degrees   Extension: 3 degrees     Right Knee   Normal passive range of motion    Mobility   Patellar Mobility:   Left Knee   WFL: medial, lateral, superior and inferior.   Tibiofemoral Mobility:   Left knee Hypomobile in the anterior and posterior tibiofemoral tendon(s).     Strength/Myotome Testing     Left Hip   Planes of Motion   Flexion: 4  Extension: 4-  Abduction: 4  Adduction: 4+    Right Hip   Planes of Motion   Flexion: 4  Extension: 4-  Abduction: 4  Adduction: 4+    Left Knee   Flexion: 4+  Extension: 4    Right Knee   Flexion: 4+  Extension: 4    Left Ankle/Foot   Dorsiflexion: 4    Right Ankle/Foot   Dorsiflexion: 4    Tests     Left Knee   Negative anterior drawer and posterior drawer.                Precautions: hx of R foot surgery, short term memory loss, hx of HTN      Manuals    L patellar joint mobs (sup/inf/med/inf)       MW grade III MW  Grade III     L knee PROM             L pes anserine STM        MW     RE MW            Neuro Re-Ed             HEP edu MW MW MW MW MW MW MW MW MW MW   Quad set X20  5\" hold   X20  5\" hold ea X20  5\" hold X20  5\" hold X20  5\" hold " "X20  5\" hold  X20 5\" hold   clams             3 way hip Abd + ext  X20 ea  Abd + ext  X20 ea   Abd + ext  X20 ea Abd + ext  X20 ea Abd + ext  X20 ea Abd  X20 ea    FTEO/FETC             Tandem stance             Step ups Fwd and lateral  X20 ea  4\"    4\"  X15 ea Fwd & lateral  4\"  X15 ea Fwd & lateral  4\"  X20 ea Held Fwd and lateral  X20 ea  4\" Fwd and lateral  X20 ea  4\"   Ther Ex             Nustep 10' lvl 4 10'  Lvl 2 10'  Lvl 4 10'  Lvl 4 10'  Lvl 4 10' lvl 4 10' lvl 4 10' lvl 4 10' lvl 4 10' lvl 4   Hip add X20  5\" hold X20 5\" hold  seated X20 5\" hold  seated X20 5\" hold  seated X20  5\" hold  X20  5\" hold X20  5\" hold X20  5\" hold X20  5\" hold X20  5\" hold   Hip abd GTB  X20 both sides  5\" hold GTB  X20  5\" hold GTB  X20  5\" hold GTB  X20  5\" hold  GTB  X20  5\"hold GTB  X20  5\" hold GTB  X20  5\" hold GTB  X20 5\" hold GTB  X20 both sides  5\" hold    LAQ X15 ea X20 ea X20 ea X20 ea  X20 ea X10 ea x15ea X15 ea X15 ea   Heel raises hold X20 ea    hold       Seated hamstring curls  GTB  X20 ea           Glute set        X20  5\" hold     Calf stretch in supported sitting          3x30\" ea   SLR          X10 ea   Supine hip abd          X10 ea   Standing marching X20 ea     X20 ea X20 ea X20 ea X20 ea    Seated marching X20 ea GTB  x20 X20 ea X20 ea  X20 ea X20 ea X20 ea X20 ea X20 ea   Ankle eversion and inversion with PT holding band OTB  X20 ea (B)    OTB  X10 ea(B) OTB  X15 ea (B) OTB  X20 ea (B) OTB  X20 ea (B) OTB  X20 ea (B) OTB  X20 ea (B)   Ther Activity                                       Gait Training                                       Modalities                                         "

## 2024-01-30 ENCOUNTER — OFFICE VISIT (OUTPATIENT)
Age: 79
End: 2024-01-30
Payer: COMMERCIAL

## 2024-01-30 DIAGNOSIS — M25.562 CHRONIC PAIN OF LEFT KNEE: Primary | ICD-10-CM

## 2024-01-30 DIAGNOSIS — M25.562 LEFT KNEE PAIN, UNSPECIFIED CHRONICITY: ICD-10-CM

## 2024-01-30 DIAGNOSIS — G89.29 CHRONIC PAIN OF LEFT KNEE: Primary | ICD-10-CM

## 2024-01-30 PROCEDURE — 97112 NEUROMUSCULAR REEDUCATION: CPT | Performed by: PHYSICAL THERAPIST

## 2024-01-30 PROCEDURE — 97110 THERAPEUTIC EXERCISES: CPT | Performed by: PHYSICAL THERAPIST

## 2024-01-30 NOTE — PROGRESS NOTES
"Daily Note     Today's date: 2024  Patient name: Brady Alicea  : 1945  MRN: [id number]  Referring provider: Madhavi Smith MD  Dx:   Encounter Diagnosis     ICD-10-CM    1. Chronic pain of left knee  M25.562     G89.29       2. Left knee pain, unspecified chronicity  M25.562           Start Time: 0815  Stop Time: 0900  Total time in clinic (min): 45 minutes    Subjective: Patient noted that he bumped his foot this morning and his L knee felt pain. Patient noted that his knee is tender to the touch.       Objective: See treatment diary below      Assessment: Patient performed Nustep aerobic exercise to increase blood flow to the area being treated, prepare the muscles for strength training and stretching, improve overall tolerance to activity, and aerobic endurance. Patient performed exercises with improved ROM and improved form. Patient continues to note pain with LAQ. PT educated the patient on his HEP. Patient would benefit from continued PT to allow the patient to return to his PLOF.         Plan: Continue per plan of care.      Precautions: hx of R foot surgery, short term memory loss, hx of HTN      Manuals    L patellar joint mobs (sup/inf/med/inf)       MW grade III MW  Grade III     L knee PROM             L pes anserine STM        MW     RE MW            Neuro Re-Ed             HEP edu MW MW    MW MW MW MW MW   Quad set X20  5\" hold     X20  5\" hold X20  5\" hold X20  5\" hold  X20 5\" hold   clams             3 way hip Abd + ext  X20 ea Abd + ext  X20 ea    Abd + ext  X20 ea Abd + ext  X20 ea Abd + ext  X20 ea Abd  X20 ea    FTEO/FETC             Tandem stance             Step ups Fwd and lateral  X20 ea  4\" Fwd and lateral  X20 ea  4\"    Fwd & lateral  4\"  X15 ea Fwd & lateral  4\"  X20 ea Held Fwd and lateral  X20 ea  4\" Fwd and lateral  X20 ea  4\"   Ther Ex             Nustep 10' lvl 4 10' lvl 4    10' lvl 4 10' lvl 4 10' lvl 4 10' lvl 4 10' " "lvl 4   Hip add X20  5\" hold X20  5\" hold    X20  5\" hold X20  5\" hold X20  5\" hold X20  5\" hold X20  5\" hold   Hip abd GTB  X20 both sides  5\" hold GTB  X20 both sides  5\" hold    GTB  X20  5\"hold GTB  X20  5\" hold GTB  X20  5\" hold GTB  X20 5\" hold GTB  X20 both sides  5\" hold    LAQ X15 ea X15 ea    X20 ea X10 ea x15ea X15 ea X15 ea   Heel raises hold     hold       Seated hamstring curls             Glute set        X20  5\" hold     Calf stretch in supported sitting          3x30\" ea   SLR          X10 ea   Supine hip abd          X10 ea   Standing marching X20 ea X20 ea    X20 ea X20 ea X20 ea X20 ea    Seated marching X20 ea X20 ea    X20 ea X20 ea X20 ea X20 ea X20 ea   Ankle eversion and inversion with PT holding band OTB  X20 ea (B) OTB  X20 ea (B)    OTB  X15 ea (B) OTB  X20 ea (B) OTB  X20 ea (B) OTB  X20 ea (B) OTB  X20 ea (B)   Ther Activity                                       Gait Training                                       Modalities                                              "

## 2024-02-02 ENCOUNTER — OFFICE VISIT (OUTPATIENT)
Age: 79
End: 2024-02-02
Payer: COMMERCIAL

## 2024-02-02 DIAGNOSIS — G89.29 CHRONIC PAIN OF LEFT KNEE: Primary | ICD-10-CM

## 2024-02-02 DIAGNOSIS — M25.562 CHRONIC PAIN OF LEFT KNEE: Primary | ICD-10-CM

## 2024-02-02 DIAGNOSIS — M25.562 LEFT KNEE PAIN, UNSPECIFIED CHRONICITY: ICD-10-CM

## 2024-02-02 PROCEDURE — 97112 NEUROMUSCULAR REEDUCATION: CPT

## 2024-02-02 PROCEDURE — 97110 THERAPEUTIC EXERCISES: CPT

## 2024-02-02 NOTE — PROGRESS NOTES
"Daily Note     Today's date: 2024  Patient name: Brady Alicea  : 1945  MRN: [id number]  Referring provider: Madhavi Smith MD  Dx:   Encounter Diagnosis     ICD-10-CM    1. Chronic pain of left knee  M25.562     G89.29       2. Left knee pain, unspecified chronicity  M25.562           Start Time: 09  Stop Time: 1015  Total time in clinic (min): 45 minutes    Subjective: Pt reports he is feeling pretty good today. Pt denies any questions or concerns at this time.      Objective: See treatment diary below      Assessment: Pt performed Nustep aerobic exercises to increase blood flow to the area being treated, prepare the muscles for strength training and stretching, improve overall tolerance to activity, and aerobic endurance. Pt did well with session without any increase in symptoms and no reports of dizziness or unsteadiness. Pt required close supervision for standing exercises as pt consistently did not leave enough room for both feet on step or caught his foot when stepping down. When performing standing leg extensions pt required therapist's leg behind them as a guide for how far to extend the leg, pt was able to self correct form and keep leg straight with this tactile cue. Pt continues to benefit from physical therapy in order to continue progressing towards goals as outlined and return to PLOF. Will continue to modify and advance current POC based on overall progress and symptom irritability.       Plan: Continue per plan of care.  Progress treatment as tolerated.       Precautions: hx of R foot surgery, short term memory loss, hx of HTN      Manuals  2/2      L patellar joint mobs (sup/inf/med/inf)       MW grade III MW  Grade III     L knee PROM             L pes anserine STM        MW     RE MW            Neuro Re-Ed             HEP edu MW MW RR   MW MW MW MW MW   Quad set X20  5\" hold  20x5\" holds   X20  5\" hold X20  5\" hold X20  5\" hold  X20 5\" " "hold   clams             3 way hip Abd + ext  X20 ea Abd + ext  X20 ea Abd + ext  X20 ea   Abd + ext  X20 ea Abd + ext  X20 ea Abd + ext  X20 ea Abd  X20 ea    FTEO/FETC             Tandem stance             Step ups Fwd and lateral  X20 ea  4\" Fwd and lateral  X20 ea  4\" Fwd and lateral  X20 ea  4\"   Fwd & lateral  4\"  X15 ea Fwd & lateral  4\"  X20 ea Held Fwd and lateral  X20 ea  4\" Fwd and lateral  X20 ea  4\"   Ther Ex             Nustep 10' lvl 4 10' lvl 4 10' L 4   10' lvl 4 10' lvl 4 10' lvl 4 10' lvl 4 10' lvl 4   Hip add X20  5\" hold X20  5\" hold X20 5\" holds   X20  5\" hold X20  5\" hold X20  5\" hold X20  5\" hold X20  5\" hold   Hip abd GTB  X20 both sides  5\" hold GTB  X20 both sides  5\" hold GTB 20x5\" holds ea b/l    GTB  X20  5\"hold GTB  X20  5\" hold GTB  X20  5\" hold GTB  X20 5\" hold GTB  X20 both sides  5\" hold    LAQ X15 ea X15 ea X15 ea   X20 ea X10 ea x15ea X15 ea X15 ea   Heel raises hold     hold       Seated hamstring curls             Glute set        X20  5\" hold     Calf stretch in supported sitting          3x30\" ea   SLR          X10 ea   Supine hip abd          X10 ea   Standing marching X20 ea X20 ea X20 ea    X20 ea X20 ea X20 ea X20 ea    Seated marching X20 ea X20 ea X20 ea   X20 ea X20 ea X20 ea X20 ea X20 ea   Ankle eversion and inversion with PT holding band OTB  X20 ea (B) OTB  X20 ea (B) OTB x20 ea b/l   OTB  X15 ea (B) OTB  X20 ea (B) OTB  X20 ea (B) OTB  X20 ea (B) OTB  X20 ea (B)   Ther Activity                                       Gait Training                                       Modalities                                                "

## 2024-02-07 ENCOUNTER — OFFICE VISIT (OUTPATIENT)
Dept: GASTROENTEROLOGY | Facility: MEDICAL CENTER | Age: 79
End: 2024-02-07
Payer: COMMERCIAL

## 2024-02-07 ENCOUNTER — OFFICE VISIT (OUTPATIENT)
Age: 79
End: 2024-02-07
Payer: COMMERCIAL

## 2024-02-07 VITALS
HEART RATE: 53 BPM | TEMPERATURE: 98.2 F | HEIGHT: 69 IN | WEIGHT: 160 LBS | SYSTOLIC BLOOD PRESSURE: 147 MMHG | BODY MASS INDEX: 23.7 KG/M2 | DIASTOLIC BLOOD PRESSURE: 76 MMHG

## 2024-02-07 DIAGNOSIS — M25.562 CHRONIC PAIN OF LEFT KNEE: Primary | ICD-10-CM

## 2024-02-07 DIAGNOSIS — M25.562 LEFT KNEE PAIN, UNSPECIFIED CHRONICITY: ICD-10-CM

## 2024-02-07 DIAGNOSIS — G89.29 CHRONIC PAIN OF LEFT KNEE: Primary | ICD-10-CM

## 2024-02-07 DIAGNOSIS — Z12.11 SCREENING FOR MALIGNANT NEOPLASM OF COLON: Primary | ICD-10-CM

## 2024-02-07 PROCEDURE — 99203 OFFICE O/P NEW LOW 30 MIN: CPT | Performed by: STUDENT IN AN ORGANIZED HEALTH CARE EDUCATION/TRAINING PROGRAM

## 2024-02-07 PROCEDURE — 97112 NEUROMUSCULAR REEDUCATION: CPT | Performed by: PHYSICAL THERAPIST

## 2024-02-07 PROCEDURE — 97110 THERAPEUTIC EXERCISES: CPT | Performed by: PHYSICAL THERAPIST

## 2024-02-07 NOTE — PROGRESS NOTES
"Daily Note     Today's date: 2024  Patient name: Brady Alicea  : 1945  MRN: [id number]  Referring provider: Madhavi Smith MD  Dx:   Encounter Diagnosis     ICD-10-CM    1. Chronic pain of left knee  M25.562     G89.29       2. Left knee pain, unspecified chronicity  M25.562           Start Time: 0815  Stop Time: 0900  Total time in clinic (min): 45 minutes    Subjective: Patient noted that his knee gave way this morning.       Objective: See treatment diary below      Assessment: Patient performed Nustep aerobic exercise to increase blood flow to the area being treated, prepare the muscles for strength training and stretching, improve overall tolerance to activity, and aerobic endurance. Patient performed step ups on a 6\" step with good form and no pain noted. Patient required cues for ankle exercises. PT educated the patient on his HEP. Patient would benefit from continued PT to allow the patient to return to his PLOF.         Plan: Continue per plan of care.  D/C at next session due to insurance.      Precautions: hx of R foot surgery, short term memory loss, hx of HTN      Manuals  2/2    L patellar joint mobs (sup/inf/med/inf)       MW grade III MW  Grade III     L knee PROM             L pes anserine STM        MW     RE MW            Neuro Re-Ed             HEP edu MW MW RR MW  MW MW MW MW MW   Quad set X20  5\" hold  20x5\" holds X20  5\" hold  X20  5\" hold X20  5\" hold X20  5\" hold  X20 5\" hold   clams             3 way hip Abd + ext  X20 ea Abd + ext  X20 ea Abd + ext  X20 ea Abd + ext  X20 ea  Abd + ext  X20 ea Abd + ext  X20 ea Abd + ext  X20 ea Abd  X20 ea    FTEO/FETC             Tandem stance             Step ups Fwd and lateral  X20 ea  4\" Fwd and lateral  X20 ea  4\" Fwd and lateral  X20 ea  4\" Fwd  6\"  X15 ea  Fwd & lateral  4\"  X15 ea Fwd & lateral  4\"  X20 ea Held Fwd and lateral  X20 ea  4\" Fwd and lateral  X20 ea  4\"   Ther Ex        " "     Nustep 10' lvl 4 10' lvl 4 10' L 4 10' lvl 4  10' lvl 4 10' lvl 4 10' lvl 4 10' lvl 4 10' lvl 4   Hip add X20  5\" hold X20  5\" hold X20 5\" holds X20  5\"hold  X20  5\" hold X20  5\" hold X20  5\" hold X20  5\" hold X20  5\" hold   Hip abd GTB  X20 both sides  5\" hold GTB  X20 both sides  5\" hold GTB 20x5\" holds ea b/l  GTB  X20 5\" hold  GTB  X20  5\"hold GTB  X20  5\" hold GTB  X20  5\" hold GTB  X20 5\" hold GTB  X20 both sides  5\" hold    LAQ X15 ea X15 ea X15 ea X15 ea  X20 ea X10 ea x15ea X15 ea X15 ea   Heel raises hold     hold       Seated hamstring curls             Glute set        X20  5\" hold     Calf stretch in supported sitting    3x30\" ea      3x30\" ea   SLR          X10 ea   Supine hip abd          X10 ea   Standing marching X20 ea X20 ea X20 ea  X20 ea  X20 ea X20 ea X20 ea X20 ea    Seated marching X20 ea X20 ea X20 ea X20 ea  X20 ea X20 ea X20 ea X20 ea X20 ea   Ankle eversion and inversion with PT holding band OTB  X20 ea (B) OTB  X20 ea (B) OTB x20 ea b/l OTB  X20 ea (B)  OTB  X15 ea (B) OTB  X20 ea (B) OTB  X20 ea (B) OTB  X20 ea (B) OTB  X20 ea (B)   Ther Activity                                       Gait Training                                       Modalities                                                  "

## 2024-02-07 NOTE — PROGRESS NOTES
[facility] - Outpatient Consultation  Brady Alicea 78 y.o. male MRN: [id number]  Encounter: [id number]      Assessment and Plan:    1. Screening for malignant neoplasm of colon        78 y.o. male w/ hx of CKD, hypertension, memory impairment who [facility].    He is presumably at average risk for CRC, and there are notes referencing a 2018 colonoscopy without any polyps (although I am unable to locate the report). I had an in-depth discussion with the patient and his son regarding risks and benefits of continuing CRC screening, especially given his advanced age and concerns for memory loss. Given his age > 75, he could reasonably stop screening. The patient made it clear to me and his son that he preferred to forego further screening colonoscopies while accepting the risk of potentially missing colon cancer.    - No further screening colonoscopy    Follow up as needed    No orders of the defined types were placed in this encounter.    ______________________________________________________________________    History of Present Illness:    Brady Alicea is a 78 y.o. male w/ hx of CKD, hypertension, memory impairment who was [facility]. He is accompanied by his son.    Patient denies abdominal pain, diarrhea, constipation, hematochezia, melena, or unintentional weight loss. No family history of colon cancer.     Although reports are not available, [facility] 2018 colon[facility] which showed internal hemorrhoids only.    Of note, patient and son report that patient has been having issues with short-term memory loss. He is undergoing MRI brain and is referred to a neurologist through [facility].      Review of Systems:  As per HPI. Otherwise negative.      Historical Information   Past Medical History:   Diagnosis Date    Hypertension      Past Surgical  "History:   Procedure Laterality Date    TOE SURGERY Right 2023    bunion     Social History   Social History     Substance and Sexual Activity   Alcohol Use Not Currently     Social History     Substance and Sexual Activity   Drug Use Never     Social History     Tobacco Use   Smoking Status Never   Smokeless Tobacco Never     History reviewed. No pertinent family history.    Meds/Allergies       Current Outpatient Medications:     esomeprazole (NexIUM) 20 mg capsule    pantoprazole (PROTONIX) 40 mg tablet    simvastatin (ZOCOR) 40 mg tablet    Allergies   Allergen Reactions    Aspirin Hives     Only full dose     Penicillins Hives           Objective     Blood pressure 147/76, pulse (!) 53, temperature 98.2 °F (36.8 °C), temperature source Tympanic, height 5' 9\" (1.753 m), weight 72.6 kg (160 lb). Body mass index is 23.63 kg/m².        Physical Exam:      General: No acute distress  Abdomen: Soft, non-tender, non-distended, normoactive bowel sounds  Neuro: Awake, alert, oriented x 3    Lab Results:   Lab Results   Component Value Date/Time    WBC 8.75 02/18/2022 11:01 AM    HGB 13.6 02/18/2022 11:01 AM     02/18/2022 11:01 AM    SODIUM 139 02/18/2022 11:01 AM    SODIUM 137 11/18/2021 03:26 PM    K 4.6 02/18/2022 11:01 AM    K 4.5 11/18/2021 03:26 PM     02/18/2022 11:01 AM     11/18/2021 03:26 PM    CO2 30 02/18/2022 11:01 AM    CO2 25 11/18/2021 03:26 PM    BUN 29 (H) 02/18/2022 11:01 AM    BUN 64 (H) 11/18/2021 03:26 PM    CREATININE 1.47 (H) 02/18/2022 11:01 AM    CREATININE 1.88 (H) 11/18/2021 03:26 PM    AST 15 12/21/2021 10:00 AM    AST 14 11/18/2021 03:26 PM    ALT 18 12/21/2021 10:00 AM    ALT 23 11/18/2021 03:26 PM    ALKPHOS 109 12/21/2021 10:00 AM    ALKPHOS 87 11/18/2021 03:26 PM    TBILI 0.35 12/21/2021 10:00 AM    TBILI 0.3 11/18/2021 03:26 PM    ALB 3.5 12/21/2021 10:00 AM    ALB 3.5 11/18/2021 03:26 PM    FERRITIN 16 02/18/2022 11:01 AM       "

## 2024-02-09 ENCOUNTER — OFFICE VISIT (OUTPATIENT)
Age: 79
End: 2024-02-09
Payer: COMMERCIAL

## 2024-02-09 DIAGNOSIS — G89.29 CHRONIC PAIN OF LEFT KNEE: Primary | ICD-10-CM

## 2024-02-09 DIAGNOSIS — M25.562 LEFT KNEE PAIN, UNSPECIFIED CHRONICITY: ICD-10-CM

## 2024-02-09 DIAGNOSIS — M25.562 CHRONIC PAIN OF LEFT KNEE: Primary | ICD-10-CM

## 2024-02-09 PROCEDURE — 97110 THERAPEUTIC EXERCISES: CPT | Performed by: PHYSICAL THERAPIST

## 2024-02-09 PROCEDURE — 97112 NEUROMUSCULAR REEDUCATION: CPT | Performed by: PHYSICAL THERAPIST

## 2024-02-09 NOTE — PROGRESS NOTES
PT Discharge    Today's date: 2024  Patient name: Brady Alicea  : 1945  MRN: [id number]  Referring provider: Madhavi Smith MD  Dx:   Encounter Diagnosis     ICD-10-CM    1. Chronic pain of left knee  M25.562     G89.29       2. Left knee pain, unspecified chronicity  M25.562           Start Time: 0945  Stop Time: 1030  Total time in clinic (min): 45 minutes    Assessment  Assessment details: Patient is a 77 yo male presenting to physical therapy with symptoms consistent with L knee that started about 3 years ago. Since last re-evaluation the patient has improved with strength, ROM and endurance. Patient is able to negotiate step, however continues to note pain in the L knee. Patient also continues to note some pain with pivoting. Patient feels (I) with his HEP and ADLs. Due to noted improvements, the patient will be D/C from PT with a HEP. PT and patient agree with POC.       Understanding of Dx/Px/POC: good   Prognosis: good    Goals  STG: In four weeks the patient will:    1. Be (I) with his HEP. (MET)  2. Increase hip and knee strength to 4+/5 MMT score to assist c ADLs.(In progress)  3. Increase L knee extension AROM to 0-3 degrees to assist c steps and gait.(In progress - 5 degrees)      LTG: In eight weeks, the patient will:    1. Increase FOTO score to 70 to demonstrate improvements in symptoms and function.(In progress)  2. Demonstrate 50% improvements in L knee AROM without pain.(In progress)  3. Perform 10 minutes on the bike at home daily without pain. (In progress)  4. Increase hip and knee strength to 5/5 MMT score to assist c prolonged activities. (In progress)  5. Amb 1 mile with minimal pain noted. (Has not tried)  6. Roll over in bed with no pain noted. (MET)  7. Descend a full flight of stairs with < 2/10 pain. (MET)    Plan  Therapy options: D/C from PT due to improvements.  Duration in visits: 1  Plan of Care beginning date: 2024  Plan of Care expiration date:  "2024  Treatment plan discussed with: patient        Subjective Evaluation    History of Present Illness  Mechanism of injury: Patient has improved with endurance since last re-evaluation. Patient continues to note pain in the L knee with steps, however is able to complete 20 step ups. Patient was educated on picking up his feet with turns to avoid pivoting motions. Patient feels (I) with his HEP and ADLs.   Patient Goals  Patient goal: \"to be more confident with walking.\" ( MET) \"to negotiate steps with less pain.\" (in progress)  Pain  Current pain ratin  At best pain ratin  At worst pain ratin  Location: L patella  Quality: sharp (lasts a few seconds; stiffness; night is painful)  Relieving factors: rest (biofreeze)  Aggravating factors: standing, walking, sitting and lifting (sleeping)  Progression: improved    Social Support  Steps to enter house: yes  4  Stairs in house: yes   8  Lives in: multiple-level home  Lives with: adult children (Son and son's girlfriend)    Employment status: not working  Hand dominance: left      Diagnostic Tests  X-ray: abnormal        Objective     Active Range of Motion   Left Knee   Flexion: 125 degrees   Extension: 5 degrees   Extensor la degrees     Right Knee   Normal active range of motion    Passive Range of Motion   Left Knee   Flexion: 125 degrees   Extension: 3 degrees     Right Knee   Normal passive range of motion    Mobility   Patellar Mobility:   Left Knee   WFL: medial, lateral, superior and inferior.   Tibiofemoral Mobility:   Left knee Hypomobile in the anterior and posterior tibiofemoral tendon(s).     Strength/Myotome Testing     Left Hip   Planes of Motion   Flexion: 4  Extension: 4  Abduction: 4+  Adduction: 4+    Right Hip   Planes of Motion   Flexion: 4  Extension: 4  Abduction: 4+  Adduction: 4+    Left Knee   Flexion: 4+  Extension: 4    Right Knee   Flexion: 4+  Extension: 4    Left Ankle/Foot   Dorsiflexion: 4+    Right Ankle/Foot " "  Dorsiflexion: 4+    Tests     Left Knee   Negative anterior drawer and posterior drawer.                Precautions: hx of R foot surgery, short term memory loss, hx of HTN    Manuals 1/26 1/30 2/2 2/7 2/9 1/18 1/19 1/22   L patellar joint mobs (sup/inf/med/inf)        MW  Grade III     L knee PROM             L pes anserine STM        MW     RE MW            Neuro Re-Ed             HEP edu MW MW RR MW MW   MW MW MW   Quad set X20  5\" hold  20x5\" holds X20  5\" hold    X20  5\" hold  X20 5\" hold   clams             3 way hip Abd + ext  X20 ea Abd + ext  X20 ea Abd + ext  X20 ea Abd + ext  X20 ea    Abd + ext  X20 ea Abd  X20 ea    FTEO/FETC             Tandem stance             Step ups Fwd and lateral  X20 ea  4\" Fwd and lateral  X20 ea  4\" Fwd and lateral  X20 ea  4\" Fwd  6\"  X15 ea Fwd  6\"  X15 ea   Held Fwd and lateral  X20 ea  4\" Fwd and lateral  X20 ea  4\"   Ther Ex             Nustep 10' lvl 4 10' lvl 4 10' L 4 10' lvl 4 15' lvl 4   10' lvl 4 10' lvl 4 10' lvl 4   Hip add X20  5\" hold X20  5\" hold X20 5\" holds X20  5\"hold X20  5\" hold   X20  5\" hold X20  5\" hold X20  5\" hold   Hip abd GTB  X20 both sides  5\" hold GTB  X20 both sides  5\" hold GTB 20x5\" holds ea b/l  GTB  X20 5\" hold GTB  X20  5\" hold   GTB  X20  5\" hold GTB  X20 5\" hold GTB  X20 both sides  5\" hold    LAQ X15 ea X15 ea X15 ea X15 ea X15 ea   x15ea X15 ea X15 ea   Heel raises hold            Seated hamstring curls             Glute set        X20  5\" hold     Calf stretch in supported sitting    3x30\" ea      3x30\" ea   SLR          X10 ea   Supine hip abd          X10 ea   Standing marching X20 ea X20 ea X20 ea  X20 ea    X20 ea X20 ea    Seated marching X20 ea X20 ea X20 ea X20 ea X20 ea   X20 ea X20 ea X20 ea   Ankle eversion and inversion with PT holding band OTB  X20 ea (B) OTB  X20 ea (B) OTB x20 ea b/l OTB  X20 ea (B)    OTB  X20 ea (B) OTB  X20 ea (B) OTB  X20 ea (B)   Ther Activity                                       Gait Training  "                                      Modalities

## 2024-02-21 ENCOUNTER — OFFICE VISIT (OUTPATIENT)
Dept: URGENT CARE | Facility: CLINIC | Age: 79
End: 2024-02-21
Payer: COMMERCIAL

## 2024-02-21 VITALS
HEART RATE: 62 BPM | RESPIRATION RATE: 20 BRPM | OXYGEN SATURATION: 99 % | BODY MASS INDEX: 24.66 KG/M2 | SYSTOLIC BLOOD PRESSURE: 128 MMHG | DIASTOLIC BLOOD PRESSURE: 86 MMHG | TEMPERATURE: 97.1 F | WEIGHT: 167 LBS

## 2024-02-21 DIAGNOSIS — H60.01 ABSCESS OF RIGHT EARLOBE: Primary | ICD-10-CM

## 2024-02-21 PROCEDURE — G0382 LEV 3 HOSP TYPE B ED VISIT: HCPCS

## 2024-02-21 RX ORDER — CLINDAMYCIN HYDROCHLORIDE 300 MG/1
300 CAPSULE ORAL 3 TIMES DAILY
Qty: 21 CAPSULE | Refills: 0 | Status: SHIPPED | OUTPATIENT
Start: 2024-02-21 | End: 2024-02-28

## 2024-02-21 NOTE — PROGRESS NOTES
[facility]        NAME: Brady Alicea is a 78 y.o. male  : 1945    MRN: [id number]  DATE: 2024  TIME: 2:29 PM    Assessment and Plan   Abscess of right earlobe [H60.01]  1. Abscess of right earlobe  Ambulatory Referral to Wound Care    clindamycin (CLEOCIN) 300 MG capsule            Patient Instructions     Start clindamycin and take as directed.    Follow-up with wound care - referral placed.  They will call you to schedule an appointment.    Follow-up with your PCP as needed.    Go to the ED for any worsening symptoms.      Chief Complaint     Chief Complaint   Patient presents with    Ear Swelling     Pt C/O right ear pain, redness, and swelling that started a couple months ago. The right ear lobe has black eschar covering with swelling and redness.          History of Present Illness       78-year-old male presenting with a wound at the base of his right earlobe. Patient states he noticed a pimple to the area 2 months ago and has been picking at it. States it started to grow in size and now there is redness surrounding the area and it is painful. He has not noticed any drainage. Denies fevers/chills, body aches, headaches, dizziness/lightheadedness, inner ear pain, worsening hearing loss (wears bilateral hearing aids), or neck pain. No history of diabetes.        Review of Systems   Review of Systems   Constitutional:  Negative for chills, diaphoresis, fatigue and fever.   HENT:  Positive for hearing loss (chronic). Negative for ear discharge and ear pain.    Respiratory:  Negative for chest tightness and shortness of breath.    Cardiovascular:  Negative for chest pain and palpitations.   Skin:  Positive for color change and wound. Negative for rash.   Neurological:  Negative for dizziness, light-headedness and headaches.         Current Medications       Current Outpatient Medications:     clindamycin (CLEOCIN) 300 MG capsule, Take 1 capsule (300 mg total) by mouth 3  (three) times a day for 7 days, Disp: 21 capsule, Rfl: 0    esomeprazole (NexIUM) 20 mg capsule, Take 1 capsule by mouth Daily, Disp: , Rfl:     pantoprazole (PROTONIX) 40 mg tablet, Take 40 mg by mouth daily (Patient not taking: Reported on 2/7/2024), Disp: , Rfl:     simvastatin (ZOCOR) 40 mg tablet, Take 40 mg by mouth (Patient not taking: Reported on 2/21/2024), Disp: , Rfl:     Current Allergies     Allergies as of 02/21/2024 - Reviewed 02/21/2024   Allergen Reaction Noted    Aspirin Hives 12/21/2021    Penicillins Hives 02/07/2024            The following portions of the patient's history were reviewed and updated as appropriate: allergies, current medications, past family history, past medical history, past social history, past surgical history and problem list.     Past Medical History:   Diagnosis Date    Hypertension        Past Surgical History:   Procedure Laterality Date    TOE SURGERY Right 2023    bunion       History reviewed. No pertinent family history.      Medications have been verified.        Objective   /86 (BP Location: Left arm, Patient Position: Sitting, Cuff Size: Standard)   Pulse 62   Temp (!) 97.1 °F (36.2 °C) (Tympanic)   Resp 20   Wt 75.8 kg (167 lb)   SpO2 99%   BMI 24.66 kg/m²        Physical Exam     Physical Exam  Vitals and nursing note reviewed.   Constitutional:       General: He is not in acute distress.     Appearance: He is not ill-appearing or diaphoretic.   HENT:      Head: Normocephalic and atraumatic.      Left Ear: External ear normal.      Ears:      Comments: Bilateral hearing aids in place.     Nose: Nose normal.      Mouth/Throat:      Mouth: Mucous membranes are moist.      Pharynx: Oropharynx is clear.   Eyes:      Conjunctiva/sclera: Conjunctivae normal.      Pupils: Pupils are equal, round, and reactive to light.   Cardiovascular:      Rate and Rhythm: Normal rate and regular rhythm.      Pulses: Normal pulses.      Heart sounds: Normal heart sounds.    Pulmonary:      Effort: Pulmonary effort is normal.      Breath sounds: Normal breath sounds.   Musculoskeletal:         General: Normal range of motion.      Cervical back: Normal range of motion and neck supple.   Lymphadenopathy:      Cervical: No cervical adenopathy.   Skin:     General: Skin is warm and dry.      Capillary Refill: Capillary refill takes less than 2 seconds.      Findings: Abscess and erythema present.      Comments: To base of right ear lobe. Black eschar present. Erythema does not extend into pre-/post-auricular areas or neck. No streaking. No open or draining areas.    Neurological:      Mental Status: He is alert and oriented to person, place, and time.

## 2024-02-21 NOTE — PATIENT INSTRUCTIONS
Start clindamycin and take as directed.    Follow-up with wound care - referral placed.  They will call you to schedule an appointment.    Follow-up with your PCP as needed.    Go to the ED for any worsening symptoms.